# Patient Record
Sex: MALE | Race: WHITE | URBAN - METROPOLITAN AREA
[De-identification: names, ages, dates, MRNs, and addresses within clinical notes are randomized per-mention and may not be internally consistent; named-entity substitution may affect disease eponyms.]

---

## 2018-03-30 ENCOUNTER — HOSPITAL ENCOUNTER (OUTPATIENT)
Age: 63
Setting detail: SPECIMEN
Discharge: HOME | End: 2018-03-30
Payer: COMMERCIAL

## 2018-04-04 LAB — SURGICAL PATHOLOGY REPORT: NORMAL

## 2018-05-11 ENCOUNTER — HOSPITAL ENCOUNTER (OUTPATIENT)
Age: 63
Setting detail: SPECIMEN
Discharge: HOME | End: 2018-05-11
Payer: COMMERCIAL

## 2018-05-14 LAB — SURGICAL PATHOLOGY REPORT: NORMAL

## 2024-10-28 ENCOUNTER — APPOINTMENT (OUTPATIENT)
Dept: CT IMAGING | Age: 69
End: 2024-10-28
Payer: MEDICARE

## 2024-10-28 ENCOUNTER — HOSPITAL ENCOUNTER (INPATIENT)
Age: 69
LOS: 3 days | End: 2024-11-01
Attending: EMERGENCY MEDICINE | Admitting: FAMILY MEDICINE
Payer: MEDICARE

## 2024-10-28 ENCOUNTER — APPOINTMENT (OUTPATIENT)
Dept: GENERAL RADIOLOGY | Age: 69
End: 2024-10-28
Payer: MEDICARE

## 2024-10-28 DIAGNOSIS — J90 PLEURAL EFFUSION: ICD-10-CM

## 2024-10-28 DIAGNOSIS — R09.02 HYPOXEMIA: Primary | ICD-10-CM

## 2024-10-28 DIAGNOSIS — A41.9 SEPTICEMIA (HCC): ICD-10-CM

## 2024-10-28 LAB
ABO + RH BLD: NORMAL
ALLEN TEST: ABNORMAL
AMMONIA PLAS-SCNC: 32 UMOL/L (ref 16–60)
ANION GAP SERPL CALCULATED.3IONS-SCNC: 13 MMOL/L (ref 9–17)
ARM BAND NUMBER: NORMAL
BACTERIA URNS QL MICRO: ABNORMAL
BASOPHILS # BLD: 0 K/UL (ref 0–0.2)
BASOPHILS NFR BLD: 0 %
BILIRUB UR QL STRIP: NEGATIVE
BLOOD BANK SAMPLE EXPIRATION: NORMAL
BLOOD GROUP ANTIBODIES SERPL: NEGATIVE
BNP SERPL-MCNC: 4587 PG/ML
BUN SERPL-MCNC: 65 MG/DL (ref 8–23)
BUN/CREAT SERPL: 38 (ref 9–20)
CALCIUM SERPL-MCNC: 7.8 MG/DL (ref 8.6–10.4)
CASTS #/AREA URNS LPF: ABNORMAL /LPF
CASTS #/AREA URNS LPF: ABNORMAL /LPF
CHLORIDE SERPL-SCNC: 88 MMOL/L (ref 98–107)
CK SERPL-CCNC: 90 U/L (ref 39–308)
CLARITY UR: CLEAR
CO2 SERPL-SCNC: 26 MMOL/L (ref 20–31)
COLOR UR: YELLOW
CREAT SERPL-MCNC: 1.7 MG/DL (ref 0.7–1.2)
EOSINOPHIL # BLD: 0 K/UL (ref 0–0.4)
EOSINOPHILS RELATIVE PERCENT: 0 % (ref 1–4)
EPI CELLS #/AREA URNS HPF: ABNORMAL /HPF (ref 0–5)
ERYTHROCYTE [DISTWIDTH] IN BLOOD BY AUTOMATED COUNT: 17.4 % (ref 11.8–14.4)
FIO2: 70
FIO2: 80
FLUAV RNA RESP QL NAA+PROBE: NOT DETECTED
FLUBV RNA RESP QL NAA+PROBE: NOT DETECTED
GFR, ESTIMATED: 43 ML/MIN/1.73M2
GLUCOSE SERPL-MCNC: 157 MG/DL (ref 70–99)
GLUCOSE UR STRIP-MCNC: NEGATIVE MG/DL
HCO3 VENOUS: 25.6 MMOL/L (ref 22–29)
HCO3 VENOUS: 29.8 MMOL/L (ref 22–29)
HCT VFR BLD AUTO: 35.8 % (ref 40.7–50.3)
HGB BLD-MCNC: 10.1 G/DL (ref 13–17)
HGB UR QL STRIP.AUTO: ABNORMAL
IMM GRANULOCYTES # BLD AUTO: 0 K/UL (ref 0–0.3)
IMM GRANULOCYTES NFR BLD: 0 %
INR PPP: 1.5
KETONES UR STRIP-MCNC: NEGATIVE MG/DL
LACTATE BLDV-SCNC: 4.1 MMOL/L (ref 0.5–1.9)
LACTATE BLDV-SCNC: 4.8 MMOL/L (ref 0.5–1.9)
LEUKOCYTE ESTERASE UR QL STRIP: NEGATIVE
LYMPHOCYTES NFR BLD: 0 K/UL (ref 1–4.8)
LYMPHOCYTES RELATIVE PERCENT: 0 % (ref 24–44)
MAGNESIUM SERPL-MCNC: 2.4 MG/DL (ref 1.6–2.6)
MCH RBC QN AUTO: 21 PG (ref 25.2–33.5)
MCHC RBC AUTO-ENTMCNC: 28.2 G/DL (ref 28.4–34.8)
MCV RBC AUTO: 74.6 FL (ref 82.6–102.9)
MODE: ABNORMAL
MONOCYTES NFR BLD: 1.01 K/UL (ref 0.2–0.8)
MONOCYTES NFR BLD: 4 % (ref 1–7)
MORPHOLOGY: ABNORMAL
MUCOUS THREADS URNS QL MICRO: ABNORMAL
MYOGLOBIN SERPL-MCNC: 227 NG/ML (ref 28–72)
MYOGLOBIN SERPL-MCNC: 285 NG/ML (ref 28–72)
NEGATIVE BASE EXCESS, ART: 4 MMOL/L (ref 0–2)
NEGATIVE BASE EXCESS, VEN: 1.8 MMOL/L (ref 0–2)
NEGATIVE BASE EXCESS, VEN: 4 MMOL/L (ref 0–2)
NEUTROPHILS NFR BLD: 96 % (ref 36–66)
NEUTS SEG NFR BLD: 24.29 K/UL (ref 1.8–7.7)
NITRITE UR QL STRIP: NEGATIVE
NRBC BLD-RTO: 0.3 PER 100 WBC
O2 DELIVERY DEVICE: ABNORMAL
O2 DELIVERY DEVICE: ABNORMAL
O2 SAT, VEN: 66.7 % (ref 60–85)
O2 SAT, VEN: 69.2 % (ref 60–85)
PARTIAL THROMBOPLASTIN TIME: 30.7 SEC (ref 23.9–33.8)
PATIENT TEMP: 34.6
PCO2 VENOUS: 73.1 MM HG (ref 41–51)
PCO2 VENOUS: 91.2 MM HG (ref 41–51)
PH UR STRIP: 5.5 [PH] (ref 5–8)
PH VENOUS: 7.12 (ref 7.32–7.43)
PH VENOUS: 7.15 (ref 7.32–7.43)
PLATELET # BLD AUTO: 285 K/UL (ref 138–453)
PMV BLD AUTO: 9.6 FL (ref 8.1–13.5)
PO2 VENOUS: 47.6 MM HG (ref 30–50)
PO2 VENOUS: 47.8 MM HG (ref 30–50)
POC HCO3: 28.4 MMOL/L (ref 21–28)
POC O2 SATURATION: 98 % (ref 94–98)
POC PCO2 TEMP: 65.8 MM HG
POC PCO2: 100.6 MM HG (ref 35–48)
POC PH TEMP: 7.18
POC PH: 7.06 (ref 7.35–7.45)
POC PO2 TEMP: 40.2 MM HG
POC PO2: 153.6 MM HG (ref 83–108)
POTASSIUM SERPL-SCNC: 5.3 MMOL/L (ref 3.7–5.3)
PROT UR STRIP-MCNC: ABNORMAL MG/DL
PROTHROMBIN TIME: 17.6 SEC (ref 11.5–14.2)
RBC # BLD AUTO: 4.8 M/UL (ref 4.21–5.77)
RBC #/AREA URNS HPF: ABNORMAL /HPF (ref 0–2)
SAMPLE SITE: ABNORMAL
SARS-COV-2 RNA RESP QL NAA+PROBE: NOT DETECTED
SODIUM SERPL-SCNC: 127 MMOL/L (ref 135–144)
SOURCE: NORMAL
SP GR UR STRIP: 1.02 (ref 1–1.03)
SPECIMEN DESCRIPTION: NORMAL
TROPONIN I SERPL HS-MCNC: 32 NG/L (ref 0–22)
TROPONIN I SERPL HS-MCNC: 41 NG/L (ref 0–22)
UROBILINOGEN UR STRIP-ACNC: NORMAL EU/DL (ref 0–1)
WBC #/AREA URNS HPF: ABNORMAL /HPF (ref 0–5)
WBC OTHER # BLD: 25.3 K/UL (ref 3.5–11.3)

## 2024-10-28 PROCEDURE — 86900 BLOOD TYPING SEROLOGIC ABO: CPT

## 2024-10-28 PROCEDURE — 96375 TX/PRO/DX INJ NEW DRUG ADDON: CPT

## 2024-10-28 PROCEDURE — 85610 PROTHROMBIN TIME: CPT

## 2024-10-28 PROCEDURE — 83735 ASSAY OF MAGNESIUM: CPT

## 2024-10-28 PROCEDURE — 83874 ASSAY OF MYOGLOBIN: CPT

## 2024-10-28 PROCEDURE — 6360000002 HC RX W HCPCS: Performed by: PHYSICIAN ASSISTANT

## 2024-10-28 PROCEDURE — 86850 RBC ANTIBODY SCREEN: CPT

## 2024-10-28 PROCEDURE — 82550 ASSAY OF CK (CPK): CPT

## 2024-10-28 PROCEDURE — 6360000004 HC RX CONTRAST MEDICATION: Performed by: PHYSICIAN ASSISTANT

## 2024-10-28 PROCEDURE — 85730 THROMBOPLASTIN TIME PARTIAL: CPT

## 2024-10-28 PROCEDURE — 93005 ELECTROCARDIOGRAM TRACING: CPT | Performed by: PHYSICIAN ASSISTANT

## 2024-10-28 PROCEDURE — 99285 EMERGENCY DEPT VISIT HI MDM: CPT

## 2024-10-28 PROCEDURE — 87086 URINE CULTURE/COLONY COUNT: CPT

## 2024-10-28 PROCEDURE — 73630 X-RAY EXAM OF FOOT: CPT

## 2024-10-28 PROCEDURE — 70450 CT HEAD/BRAIN W/O DYE: CPT

## 2024-10-28 PROCEDURE — 83880 ASSAY OF NATRIURETIC PEPTIDE: CPT

## 2024-10-28 PROCEDURE — 2500000003 HC RX 250 WO HCPCS: Performed by: PHYSICIAN ASSISTANT

## 2024-10-28 PROCEDURE — 2580000003 HC RX 258: Performed by: PHYSICIAN ASSISTANT

## 2024-10-28 PROCEDURE — 87040 BLOOD CULTURE FOR BACTERIA: CPT

## 2024-10-28 PROCEDURE — 84484 ASSAY OF TROPONIN QUANT: CPT

## 2024-10-28 PROCEDURE — 81001 URINALYSIS AUTO W/SCOPE: CPT

## 2024-10-28 PROCEDURE — 82140 ASSAY OF AMMONIA: CPT

## 2024-10-28 PROCEDURE — 85025 COMPLETE CBC W/AUTO DIFF WBC: CPT

## 2024-10-28 PROCEDURE — 96374 THER/PROPH/DIAG INJ IV PUSH: CPT

## 2024-10-28 PROCEDURE — 94761 N-INVAS EAR/PLS OXIMETRY MLT: CPT

## 2024-10-28 PROCEDURE — 87636 SARSCOV2 & INF A&B AMP PRB: CPT

## 2024-10-28 PROCEDURE — 94640 AIRWAY INHALATION TREATMENT: CPT

## 2024-10-28 PROCEDURE — 83605 ASSAY OF LACTIC ACID: CPT

## 2024-10-28 PROCEDURE — 87186 SC STD MICRODIL/AGAR DIL: CPT

## 2024-10-28 PROCEDURE — 82803 BLOOD GASES ANY COMBINATION: CPT

## 2024-10-28 PROCEDURE — 71045 X-RAY EXAM CHEST 1 VIEW: CPT

## 2024-10-28 PROCEDURE — 2700000000 HC OXYGEN THERAPY PER DAY

## 2024-10-28 PROCEDURE — 80048 BASIC METABOLIC PNL TOTAL CA: CPT

## 2024-10-28 PROCEDURE — 0BH17EZ INSERTION OF ENDOTRACHEAL AIRWAY INTO TRACHEA, VIA NATURAL OR ARTIFICIAL OPENING: ICD-10-PCS | Performed by: EMERGENCY MEDICINE

## 2024-10-28 PROCEDURE — 3E033XZ INTRODUCTION OF VASOPRESSOR INTO PERIPHERAL VEIN, PERCUTANEOUS APPROACH: ICD-10-PCS | Performed by: FAMILY MEDICINE

## 2024-10-28 PROCEDURE — 71260 CT THORAX DX C+: CPT

## 2024-10-28 PROCEDURE — 36600 WITHDRAWAL OF ARTERIAL BLOOD: CPT

## 2024-10-28 PROCEDURE — 5A09357 ASSISTANCE WITH RESPIRATORY VENTILATION, LESS THAN 24 CONSECUTIVE HOURS, CONTINUOUS POSITIVE AIRWAY PRESSURE: ICD-10-PCS | Performed by: EMERGENCY MEDICINE

## 2024-10-28 PROCEDURE — 86901 BLOOD TYPING SEROLOGIC RH(D): CPT

## 2024-10-28 PROCEDURE — 87077 CULTURE AEROBIC IDENTIFY: CPT

## 2024-10-28 PROCEDURE — 94660 CPAP INITIATION&MGMT: CPT

## 2024-10-28 PROCEDURE — 96365 THER/PROPH/DIAG IV INF INIT: CPT

## 2024-10-28 PROCEDURE — 74177 CT ABD & PELVIS W/CONTRAST: CPT

## 2024-10-28 RX ORDER — LORAZEPAM 2 MG/ML
0.5 INJECTION INTRAMUSCULAR ONCE
Status: COMPLETED | OUTPATIENT
Start: 2024-10-28 | End: 2024-10-28

## 2024-10-28 RX ORDER — NOREPINEPHRINE BITARTRATE 0.06 MG/ML
1-100 INJECTION, SOLUTION INTRAVENOUS CONTINUOUS
Status: DISCONTINUED | OUTPATIENT
Start: 2024-10-28 | End: 2024-11-01 | Stop reason: HOSPADM

## 2024-10-28 RX ORDER — 0.9 % SODIUM CHLORIDE 0.9 %
80 INTRAVENOUS SOLUTION INTRAVENOUS ONCE
Status: COMPLETED | OUTPATIENT
Start: 2024-10-28 | End: 2024-10-28

## 2024-10-28 RX ORDER — ALBUTEROL SULFATE 0.83 MG/ML
2.5 SOLUTION RESPIRATORY (INHALATION) ONCE
Status: COMPLETED | OUTPATIENT
Start: 2024-10-28 | End: 2024-10-28

## 2024-10-28 RX ORDER — SODIUM CHLORIDE 0.9 % (FLUSH) 0.9 %
10 SYRINGE (ML) INJECTION PRN
Status: DISCONTINUED | OUTPATIENT
Start: 2024-10-28 | End: 2024-11-01 | Stop reason: HOSPADM

## 2024-10-28 RX ORDER — IOPAMIDOL 755 MG/ML
75 INJECTION, SOLUTION INTRAVASCULAR
Status: COMPLETED | OUTPATIENT
Start: 2024-10-28 | End: 2024-10-28

## 2024-10-28 RX ORDER — SODIUM CHLORIDE 0.9 % (FLUSH) 0.9 %
5-40 SYRINGE (ML) INJECTION PRN
Status: DISCONTINUED | OUTPATIENT
Start: 2024-10-28 | End: 2024-11-01 | Stop reason: HOSPADM

## 2024-10-28 RX ORDER — 0.9 % SODIUM CHLORIDE 0.9 %
500 INTRAVENOUS SOLUTION INTRAVENOUS ONCE
Status: COMPLETED | OUTPATIENT
Start: 2024-10-28 | End: 2024-10-28

## 2024-10-28 RX ORDER — LEVOFLOXACIN 5 MG/ML
500 INJECTION, SOLUTION INTRAVENOUS ONCE
Status: COMPLETED | OUTPATIENT
Start: 2024-10-28 | End: 2024-10-28

## 2024-10-28 RX ORDER — SODIUM CHLORIDE 9 MG/ML
INJECTION, SOLUTION INTRAVENOUS PRN
Status: DISCONTINUED | OUTPATIENT
Start: 2024-10-28 | End: 2024-10-29 | Stop reason: SDUPTHER

## 2024-10-28 RX ORDER — SODIUM CHLORIDE 0.9 % (FLUSH) 0.9 %
5-40 SYRINGE (ML) INJECTION EVERY 12 HOURS SCHEDULED
Status: DISCONTINUED | OUTPATIENT
Start: 2024-10-28 | End: 2024-11-01 | Stop reason: HOSPADM

## 2024-10-28 RX ADMIN — WATER 1000 MG: 1 INJECTION INTRAMUSCULAR; INTRAVENOUS; SUBCUTANEOUS at 21:58

## 2024-10-28 RX ADMIN — SODIUM CHLORIDE 80 ML: 0.9 INJECTION, SOLUTION INTRAVENOUS at 21:32

## 2024-10-28 RX ADMIN — SODIUM CHLORIDE, PRESERVATIVE FREE 10 ML: 5 INJECTION INTRAVENOUS at 21:32

## 2024-10-28 RX ADMIN — LEVOFLOXACIN 500 MG: 5 INJECTION, SOLUTION INTRAVENOUS at 21:57

## 2024-10-28 RX ADMIN — IOPAMIDOL 75 ML: 755 INJECTION, SOLUTION INTRAVENOUS at 21:32

## 2024-10-28 RX ADMIN — LORAZEPAM 0.5 MG: 2 INJECTION INTRAMUSCULAR; INTRAVENOUS at 20:14

## 2024-10-28 RX ADMIN — SODIUM CHLORIDE 500 ML: 0.9 INJECTION, SOLUTION INTRAVENOUS at 21:53

## 2024-10-28 RX ADMIN — NOREPINEPHRINE BITARTRATE 5 MCG/MIN: 0.06 INJECTION, SOLUTION INTRAVENOUS at 22:53

## 2024-10-28 RX ADMIN — SODIUM CHLORIDE, PRESERVATIVE FREE 10 ML: 5 INJECTION INTRAVENOUS at 21:58

## 2024-10-28 RX ADMIN — ALBUTEROL SULFATE 2.5 MG: 2.5 SOLUTION RESPIRATORY (INHALATION) at 19:51

## 2024-10-28 NOTE — ED PROVIDER NOTES
CULTURE, BLOOD 2   CBC WITH AUTO DIFFERENTIAL   BASIC METABOLIC PANEL   TROP/MYOGLOBIN   TROP/MYOGLOBIN   TROP/MYOGLOBIN   CK   LACTATE, SEPSIS   LACTATE, SEPSIS   BRAIN NATRIURETIC PEPTIDE   MAGNESIUM   AMMONIA   TYPE AND SCREEN     CONSULTS:  None  FINAL IMPRESSION    No diagnosis found.        PASTMEDICAL HISTORY     Past Medical History:   Diagnosis Date    Hypertension     Rib fractures      SURGICAL HISTORY     No past surgical history on file.  CURRENT MEDICATIONS       Previous Medications    ALPRAZOLAM (XANAX) 1 MG TABLET    Take 1 mg by mouth 3 times daily as needed for Sleep.    DULOXETINE (CYMBALTA) 30 MG CAPSULE    Take 30 mg by mouth 2 times daily.    IBUPROFEN (ADVIL;MOTRIN) 800 MG TABLET    Take 1 tablet by mouth every 8 hours as needed for Pain.    OXYCODONE-ACETAMINOPHEN (PERCOCET)  MG PER TABLET    Take 1 tablet by mouth every 6 hours as needed for Pain.    VALSARTAN-HYDROCHLOROTHIAZIDE (DIOVAN HCT) 160-25 MG PER TABLET    Take 1 tablet by mouth daily.     ALLERGIES     is allergic to erythromycin and tetracyclines & related.  FAMILY HISTORY     has no family status information on file.      SOCIAL HISTORY       Social History     Tobacco Use    Smoking status: Every Day     Current packs/day: 1.00     Average packs/day: 1 pack/day for 20.0 years (20.0 ttl pk-yrs)     Types: Cigarettes   Substance Use Topics    Alcohol use: No    Drug use: No          Vadim Miller MD  Attending Emergency Physician         Vadim Miller MD  10/28/24 2009

## 2024-10-29 ENCOUNTER — APPOINTMENT (OUTPATIENT)
Dept: GENERAL RADIOLOGY | Age: 69
End: 2024-10-29
Payer: MEDICARE

## 2024-10-29 PROBLEM — E87.1 HYPONATREMIA: Status: ACTIVE | Noted: 2024-10-29

## 2024-10-29 LAB
ALBUMIN SERPL-MCNC: 2.3 G/DL (ref 3.5–5.2)
ALLEN TEST: POSITIVE
ALP SERPL-CCNC: 88 U/L (ref 40–129)
ALT SERPL-CCNC: 176 U/L (ref 5–41)
ANION GAP SERPL CALCULATED.3IONS-SCNC: 13 MMOL/L (ref 9–17)
ANION GAP SERPL CALCULATED.3IONS-SCNC: 15 MMOL/L (ref 9–17)
AST SERPL-CCNC: 96 U/L
BILIRUB DIRECT SERPL-MCNC: 0.3 MG/DL
BILIRUB INDIRECT SERPL-MCNC: 0.1 MG/DL (ref 0–1)
BILIRUB SERPL-MCNC: 0.4 MG/DL (ref 0.3–1.2)
BUN SERPL-MCNC: 68 MG/DL (ref 8–23)
BUN SERPL-MCNC: 75 MG/DL (ref 8–23)
BUN/CREAT SERPL: 40 (ref 9–20)
BUN/CREAT SERPL: 44 (ref 9–20)
CALCIUM SERPL-MCNC: 7.5 MG/DL (ref 8.6–10.4)
CALCIUM SERPL-MCNC: 7.6 MG/DL (ref 8.6–10.4)
CHLORIDE SERPL-SCNC: 91 MMOL/L (ref 98–107)
CHLORIDE SERPL-SCNC: 92 MMOL/L (ref 98–107)
CO2 SERPL-SCNC: 24 MMOL/L (ref 20–31)
CO2 SERPL-SCNC: 29 MMOL/L (ref 20–31)
CREAT SERPL-MCNC: 1.7 MG/DL (ref 0.7–1.2)
CREAT SERPL-MCNC: 1.7 MG/DL (ref 0.7–1.2)
EKG ATRIAL RATE: 90 BPM
EKG P AXIS: 58 DEGREES
EKG P-R INTERVAL: 140 MS
EKG Q-T INTERVAL: 390 MS
EKG QRS DURATION: 100 MS
EKG QTC CALCULATION (BAZETT): 477 MS
EKG R AXIS: 50 DEGREES
EKG T AXIS: 112 DEGREES
EKG VENTRICULAR RATE: 90 BPM
FIO2: 100
FIO2: 60
GFR, ESTIMATED: 43 ML/MIN/1.73M2
GFR, ESTIMATED: 43 ML/MIN/1.73M2
GLUCOSE BLD-MCNC: 153 MG/DL (ref 75–110)
GLUCOSE BLD-MCNC: 158 MG/DL (ref 75–110)
GLUCOSE BLD-MCNC: 161 MG/DL (ref 75–110)
GLUCOSE BLD-MCNC: 172 MG/DL (ref 75–110)
GLUCOSE SERPL-MCNC: 138 MG/DL (ref 70–99)
GLUCOSE SERPL-MCNC: 164 MG/DL (ref 70–99)
HCO3 VENOUS: 32.3 MMOL/L (ref 22–29)
HCT VFR BLD AUTO: 33.4 % (ref 40.7–50.3)
HGB BLD-MCNC: 10.5 G/DL (ref 13–17)
LACTATE BLDV-SCNC: 2.7 MMOL/L (ref 0.5–1.9)
LACTATE BLDV-SCNC: 2.8 MMOL/L (ref 0.5–1.9)
LIPASE SERPL-CCNC: 10 U/L (ref 13–60)
MODE: ABNORMAL
MODE: ABNORMAL
MYOGLOBIN SERPL-MCNC: 230 NG/ML (ref 28–72)
O2 DELIVERY DEVICE: ABNORMAL
O2 DELIVERY DEVICE: ABNORMAL
O2 SAT, VEN: 90.1 % (ref 60–85)
PATIENT TEMP: 37
PCO2 VENOUS: 66.3 MM HG (ref 41–51)
PH VENOUS: 7.3 (ref 7.32–7.43)
PO2 VENOUS: 67.6 MM HG (ref 30–50)
POC HCO3: 29.4 MMOL/L (ref 21–28)
POC HCO3: 31.5 MMOL/L (ref 21–28)
POC O2 SATURATION: 92.3 % (ref 94–98)
POC O2 SATURATION: 99.5 % (ref 94–98)
POC PCO2: 40.7 MM HG (ref 35–48)
POC PCO2: 47.3 MM HG (ref 35–48)
POC PH: 7.43 (ref 7.35–7.45)
POC PH: 7.47 (ref 7.35–7.45)
POC PO2: 156.5 MM HG (ref 83–108)
POC PO2: 63.7 MM HG (ref 83–108)
POSITIVE BASE EXCESS, ART: 5.2 MMOL/L (ref 0–3)
POSITIVE BASE EXCESS, ART: 6.2 MMOL/L (ref 0–3)
POSITIVE BASE EXCESS, VEN: 3.9 MMOL/L (ref 0–3)
POTASSIUM SERPL-SCNC: 4.7 MMOL/L (ref 3.7–5.3)
POTASSIUM SERPL-SCNC: 5.3 MMOL/L (ref 3.7–5.3)
POTASSIUM SERPL-SCNC: 5.8 MMOL/L (ref 3.7–5.3)
PROT SERPL-MCNC: 5.6 G/DL (ref 6.4–8.3)
SAMPLE SITE: ABNORMAL
SAMPLE SITE: ABNORMAL
SODIUM SERPL-SCNC: 130 MMOL/L (ref 135–144)
SODIUM SERPL-SCNC: 131 MMOL/L (ref 135–144)
SODIUM SERPL-SCNC: 132 MMOL/L (ref 135–144)
SODIUM SERPL-SCNC: 134 MMOL/L (ref 135–144)
SODIUM SERPL-SCNC: 134 MMOL/L (ref 135–144)
TROPONIN I SERPL HS-MCNC: 33 NG/L (ref 0–22)

## 2024-10-29 PROCEDURE — 94660 CPAP INITIATION&MGMT: CPT

## 2024-10-29 PROCEDURE — 2500000003 HC RX 250 WO HCPCS

## 2024-10-29 PROCEDURE — 82803 BLOOD GASES ANY COMBINATION: CPT

## 2024-10-29 PROCEDURE — 71045 X-RAY EXAM CHEST 1 VIEW: CPT

## 2024-10-29 PROCEDURE — 2580000003 HC RX 258: Performed by: PHYSICIAN ASSISTANT

## 2024-10-29 PROCEDURE — 85014 HEMATOCRIT: CPT

## 2024-10-29 PROCEDURE — 84295 ASSAY OF SERUM SODIUM: CPT

## 2024-10-29 PROCEDURE — 87070 CULTURE OTHR SPECIMN AEROBIC: CPT

## 2024-10-29 PROCEDURE — 6370000000 HC RX 637 (ALT 250 FOR IP): Performed by: NURSE PRACTITIONER

## 2024-10-29 PROCEDURE — 6370000000 HC RX 637 (ALT 250 FOR IP): Performed by: INTERNAL MEDICINE

## 2024-10-29 PROCEDURE — 6360000002 HC RX W HCPCS: Performed by: FAMILY MEDICINE

## 2024-10-29 PROCEDURE — 2580000003 HC RX 258: Performed by: FAMILY MEDICINE

## 2024-10-29 PROCEDURE — 5A1945Z RESPIRATORY VENTILATION, 24-96 CONSECUTIVE HOURS: ICD-10-PCS | Performed by: INTERNAL MEDICINE

## 2024-10-29 PROCEDURE — 31500 INSERT EMERGENCY AIRWAY: CPT

## 2024-10-29 PROCEDURE — 6360000002 HC RX W HCPCS: Performed by: INTERNAL MEDICINE

## 2024-10-29 PROCEDURE — 6360000002 HC RX W HCPCS: Performed by: EMERGENCY MEDICINE

## 2024-10-29 PROCEDURE — 87040 BLOOD CULTURE FOR BACTERIA: CPT

## 2024-10-29 PROCEDURE — 36620 INSERTION CATHETER ARTERY: CPT

## 2024-10-29 PROCEDURE — 83605 ASSAY OF LACTIC ACID: CPT

## 2024-10-29 PROCEDURE — 6360000002 HC RX W HCPCS: Performed by: PHYSICIAN ASSISTANT

## 2024-10-29 PROCEDURE — 93010 ELECTROCARDIOGRAM REPORT: CPT | Performed by: INTERNAL MEDICINE

## 2024-10-29 PROCEDURE — 84132 ASSAY OF SERUM POTASSIUM: CPT

## 2024-10-29 PROCEDURE — 85018 HEMOGLOBIN: CPT

## 2024-10-29 PROCEDURE — 94761 N-INVAS EAR/PLS OXIMETRY MLT: CPT

## 2024-10-29 PROCEDURE — 2500000003 HC RX 250 WO HCPCS: Performed by: INTERNAL MEDICINE

## 2024-10-29 PROCEDURE — 96375 TX/PRO/DX INJ NEW DRUG ADDON: CPT

## 2024-10-29 PROCEDURE — 2500000003 HC RX 250 WO HCPCS: Performed by: NURSE PRACTITIONER

## 2024-10-29 PROCEDURE — 2580000003 HC RX 258: Performed by: INTERNAL MEDICINE

## 2024-10-29 PROCEDURE — 6360000002 HC RX W HCPCS

## 2024-10-29 PROCEDURE — 2000000000 HC ICU R&B

## 2024-10-29 PROCEDURE — 89220 SPUTUM SPECIMEN COLLECTION: CPT

## 2024-10-29 PROCEDURE — 80076 HEPATIC FUNCTION PANEL: CPT

## 2024-10-29 PROCEDURE — 2580000003 HC RX 258: Performed by: NURSE PRACTITIONER

## 2024-10-29 PROCEDURE — 36415 COLL VENOUS BLD VENIPUNCTURE: CPT

## 2024-10-29 PROCEDURE — 94002 VENT MGMT INPAT INIT DAY: CPT

## 2024-10-29 PROCEDURE — 36600 WITHDRAWAL OF ARTERIAL BLOOD: CPT

## 2024-10-29 PROCEDURE — 87205 SMEAR GRAM STAIN: CPT

## 2024-10-29 PROCEDURE — 74018 RADEX ABDOMEN 1 VIEW: CPT

## 2024-10-29 PROCEDURE — 82947 ASSAY GLUCOSE BLOOD QUANT: CPT

## 2024-10-29 PROCEDURE — 2700000000 HC OXYGEN THERAPY PER DAY

## 2024-10-29 PROCEDURE — 80048 BASIC METABOLIC PNL TOTAL CA: CPT

## 2024-10-29 PROCEDURE — 6360000002 HC RX W HCPCS: Performed by: NURSE PRACTITIONER

## 2024-10-29 PROCEDURE — 83690 ASSAY OF LIPASE: CPT

## 2024-10-29 PROCEDURE — 94640 AIRWAY INHALATION TREATMENT: CPT

## 2024-10-29 PROCEDURE — 02HV33Z INSERTION OF INFUSION DEVICE INTO SUPERIOR VENA CAVA, PERCUTANEOUS APPROACH: ICD-10-PCS | Performed by: FAMILY MEDICINE

## 2024-10-29 RX ORDER — POTASSIUM CHLORIDE 29.8 MG/ML
20 INJECTION INTRAVENOUS PRN
Status: DISCONTINUED | OUTPATIENT
Start: 2024-10-29 | End: 2024-11-01 | Stop reason: HOSPADM

## 2024-10-29 RX ORDER — LORAZEPAM 2 MG/ML
0.5 INJECTION INTRAMUSCULAR ONCE
Status: COMPLETED | OUTPATIENT
Start: 2024-10-29 | End: 2024-10-29

## 2024-10-29 RX ORDER — DAPAGLIFLOZIN AND METFORMIN HYDROCHLORIDE 10; 1000 MG/1; MG/1
1 TABLET, FILM COATED, EXTENDED RELEASE ORAL DAILY
COMMUNITY

## 2024-10-29 RX ORDER — IPRATROPIUM BROMIDE AND ALBUTEROL SULFATE 2.5; .5 MG/3ML; MG/3ML
1 SOLUTION RESPIRATORY (INHALATION)
Status: DISCONTINUED | OUTPATIENT
Start: 2024-10-29 | End: 2024-10-29

## 2024-10-29 RX ORDER — PROPOFOL 10 MG/ML
5-50 INJECTION, EMULSION INTRAVENOUS CONTINUOUS
Status: DISCONTINUED | OUTPATIENT
Start: 2024-10-29 | End: 2024-10-29

## 2024-10-29 RX ORDER — LEVALBUTEROL 1.25 MG/.5ML
1.25 SOLUTION, CONCENTRATE RESPIRATORY (INHALATION)
Status: DISCONTINUED | OUTPATIENT
Start: 2024-10-29 | End: 2024-10-31

## 2024-10-29 RX ORDER — FUROSEMIDE 10 MG/ML
20 INJECTION INTRAMUSCULAR; INTRAVENOUS 2 TIMES DAILY
Status: DISCONTINUED | OUTPATIENT
Start: 2024-10-29 | End: 2024-10-30

## 2024-10-29 RX ORDER — BUDESONIDE 0.5 MG/2ML
0.5 INHALANT ORAL
Status: DISCONTINUED | OUTPATIENT
Start: 2024-10-29 | End: 2024-10-31

## 2024-10-29 RX ORDER — SODIUM CHLORIDE 9 MG/ML
INJECTION, SOLUTION INTRAVENOUS PRN
Status: DISCONTINUED | OUTPATIENT
Start: 2024-10-29 | End: 2024-11-01 | Stop reason: HOSPADM

## 2024-10-29 RX ORDER — SODIUM CHLORIDE 0.9 % (FLUSH) 0.9 %
5-40 SYRINGE (ML) INJECTION PRN
Status: DISCONTINUED | OUTPATIENT
Start: 2024-10-29 | End: 2024-11-01 | Stop reason: HOSPADM

## 2024-10-29 RX ORDER — SODIUM CHLORIDE FOR INHALATION 0.9 %
3 VIAL, NEBULIZER (ML) INHALATION EVERY 8 HOURS PRN
Status: DISCONTINUED | OUTPATIENT
Start: 2024-10-29 | End: 2024-11-01

## 2024-10-29 RX ORDER — PROPOFOL 10 MG/ML
5-50 INJECTION, EMULSION INTRAVENOUS CONTINUOUS
Status: DISCONTINUED | OUTPATIENT
Start: 2024-10-29 | End: 2024-11-01 | Stop reason: HOSPADM

## 2024-10-29 RX ORDER — ENOXAPARIN SODIUM 100 MG/ML
40 INJECTION SUBCUTANEOUS DAILY
Status: DISCONTINUED | OUTPATIENT
Start: 2024-10-29 | End: 2024-11-01 | Stop reason: HOSPADM

## 2024-10-29 RX ORDER — SODIUM CHLORIDE 0.9 % (FLUSH) 0.9 %
5-40 SYRINGE (ML) INJECTION EVERY 12 HOURS SCHEDULED
Status: DISCONTINUED | OUTPATIENT
Start: 2024-10-29 | End: 2024-11-01 | Stop reason: HOSPADM

## 2024-10-29 RX ORDER — 0.9 % SODIUM CHLORIDE 0.9 %
500 INTRAVENOUS SOLUTION INTRAVENOUS ONCE
Status: COMPLETED | OUTPATIENT
Start: 2024-10-29 | End: 2024-10-29

## 2024-10-29 RX ORDER — 0.9 % SODIUM CHLORIDE 0.9 %
1000 INTRAVENOUS SOLUTION INTRAVENOUS ONCE
Status: COMPLETED | OUTPATIENT
Start: 2024-10-29 | End: 2024-10-29

## 2024-10-29 RX ORDER — TAMSULOSIN HYDROCHLORIDE 0.4 MG/1
0.8 CAPSULE ORAL DAILY
COMMUNITY

## 2024-10-29 RX ORDER — FENTANYL CITRATE 0.05 MG/ML
25 INJECTION, SOLUTION INTRAMUSCULAR; INTRAVENOUS
Status: DISCONTINUED | OUTPATIENT
Start: 2024-10-29 | End: 2024-10-30

## 2024-10-29 RX ORDER — LORAZEPAM 2 MG/ML
1 INJECTION INTRAMUSCULAR
Status: DISCONTINUED | OUTPATIENT
Start: 2024-10-29 | End: 2024-10-31

## 2024-10-29 RX ORDER — CYCLOBENZAPRINE HCL 10 MG
10 TABLET ORAL 3 TIMES DAILY PRN
COMMUNITY

## 2024-10-29 RX ORDER — LORAZEPAM 2 MG/ML
0.25 INJECTION INTRAMUSCULAR ONCE
Status: COMPLETED | OUTPATIENT
Start: 2024-10-29 | End: 2024-10-29

## 2024-10-29 RX ORDER — FOLIC ACID 1 MG/1
1 TABLET ORAL DAILY
Status: DISCONTINUED | OUTPATIENT
Start: 2024-10-29 | End: 2024-10-30

## 2024-10-29 RX ORDER — ONDANSETRON 4 MG/1
4 TABLET, ORALLY DISINTEGRATING ORAL EVERY 8 HOURS PRN
Status: DISCONTINUED | OUTPATIENT
Start: 2024-10-29 | End: 2024-11-01 | Stop reason: HOSPADM

## 2024-10-29 RX ORDER — ACETAMINOPHEN 325 MG/1
650 TABLET ORAL EVERY 6 HOURS PRN
Status: DISCONTINUED | OUTPATIENT
Start: 2024-10-29 | End: 2024-11-01 | Stop reason: HOSPADM

## 2024-10-29 RX ORDER — ONDANSETRON 2 MG/ML
4 INJECTION INTRAMUSCULAR; INTRAVENOUS EVERY 6 HOURS PRN
Status: DISCONTINUED | OUTPATIENT
Start: 2024-10-29 | End: 2024-11-01 | Stop reason: HOSPADM

## 2024-10-29 RX ORDER — ACETAMINOPHEN 650 MG/1
650 SUPPOSITORY RECTAL EVERY 6 HOURS PRN
Status: DISCONTINUED | OUTPATIENT
Start: 2024-10-29 | End: 2024-11-01 | Stop reason: HOSPADM

## 2024-10-29 RX ORDER — MAGNESIUM SULFATE IN WATER 40 MG/ML
2000 INJECTION, SOLUTION INTRAVENOUS PRN
Status: DISCONTINUED | OUTPATIENT
Start: 2024-10-29 | End: 2024-11-01 | Stop reason: HOSPADM

## 2024-10-29 RX ORDER — POLYETHYLENE GLYCOL 3350 17 G/17G
17 POWDER, FOR SOLUTION ORAL DAILY PRN
Status: DISCONTINUED | OUTPATIENT
Start: 2024-10-29 | End: 2024-11-01 | Stop reason: HOSPADM

## 2024-10-29 RX ORDER — CLOPIDOGREL BISULFATE 75 MG/1
75 TABLET ORAL DAILY
COMMUNITY

## 2024-10-29 RX ORDER — POTASSIUM CHLORIDE 7.45 MG/ML
10 INJECTION INTRAVENOUS PRN
Status: DISCONTINUED | OUTPATIENT
Start: 2024-10-29 | End: 2024-11-01 | Stop reason: HOSPADM

## 2024-10-29 RX ADMIN — DEXMEDETOMIDINE 1.4 MCG/KG/HR: 100 INJECTION, SOLUTION INTRAVENOUS at 09:30

## 2024-10-29 RX ADMIN — FUROSEMIDE 20 MG: 10 INJECTION, SOLUTION INTRAMUSCULAR; INTRAVENOUS at 17:25

## 2024-10-29 RX ADMIN — DEXMEDETOMIDINE 1.5 MCG/KG/HR: 100 INJECTION, SOLUTION INTRAVENOUS at 13:58

## 2024-10-29 RX ADMIN — FAMOTIDINE 20 MG: 10 INJECTION, SOLUTION INTRAVENOUS at 09:12

## 2024-10-29 RX ADMIN — IPRATROPIUM BROMIDE 0.5 MG: 0.5 SOLUTION RESPIRATORY (INHALATION) at 11:00

## 2024-10-29 RX ADMIN — FOLIC ACID 1 MG: 1 TABLET ORAL at 21:00

## 2024-10-29 RX ADMIN — SODIUM CHLORIDE, PRESERVATIVE FREE 40 MG: 5 INJECTION INTRAVENOUS at 17:25

## 2024-10-29 RX ADMIN — WATER 60 MG: 1 INJECTION INTRAMUSCULAR; INTRAVENOUS; SUBCUTANEOUS at 13:08

## 2024-10-29 RX ADMIN — LEVALBUTEROL 1.25 MG: 1.25 SOLUTION, CONCENTRATE RESPIRATORY (INHALATION) at 11:00

## 2024-10-29 RX ADMIN — PROPOFOL 20 MCG/KG/MIN: 10 INJECTION, EMULSION INTRAVENOUS at 15:29

## 2024-10-29 RX ADMIN — LORAZEPAM 0.5 MG: 2 INJECTION INTRAMUSCULAR; INTRAVENOUS at 01:49

## 2024-10-29 RX ADMIN — SODIUM CHLORIDE, PRESERVATIVE FREE 10 ML: 5 INJECTION INTRAVENOUS at 09:13

## 2024-10-29 RX ADMIN — THIAMINE HYDROCHLORIDE 100 MG: 100 INJECTION, SOLUTION INTRAMUSCULAR; INTRAVENOUS at 20:46

## 2024-10-29 RX ADMIN — DOXYCYCLINE 100 MG: 100 INJECTION, POWDER, LYOPHILIZED, FOR SOLUTION INTRAVENOUS at 09:29

## 2024-10-29 RX ADMIN — DOXYCYCLINE 100 MG: 100 INJECTION, POWDER, LYOPHILIZED, FOR SOLUTION INTRAVENOUS at 20:47

## 2024-10-29 RX ADMIN — ISODIUM CHLORIDE 3 ML: 0.03 SOLUTION RESPIRATORY (INHALATION) at 15:20

## 2024-10-29 RX ADMIN — DEXMEDETOMIDINE 0.2 MCG/KG/HR: 100 INJECTION, SOLUTION INTRAVENOUS at 02:14

## 2024-10-29 RX ADMIN — LEVALBUTEROL 1.25 MG: 1.25 SOLUTION, CONCENTRATE RESPIRATORY (INHALATION) at 19:41

## 2024-10-29 RX ADMIN — ACETAMINOPHEN 650 MG: 650 SUPPOSITORY RECTAL at 18:50

## 2024-10-29 RX ADMIN — SODIUM ZIRCONIUM CYCLOSILICATE 10 G: 10 POWDER, FOR SUSPENSION ORAL at 13:08

## 2024-10-29 RX ADMIN — DEXMEDETOMIDINE 1.3 MCG/KG/HR: 100 INJECTION, SOLUTION INTRAVENOUS at 18:48

## 2024-10-29 RX ADMIN — SODIUM CHLORIDE, PRESERVATIVE FREE 10 ML: 5 INJECTION INTRAVENOUS at 20:48

## 2024-10-29 RX ADMIN — SODIUM CHLORIDE 1000 ML: 9 INJECTION, SOLUTION INTRAVENOUS at 19:19

## 2024-10-29 RX ADMIN — SODIUM CHLORIDE 500 ML: 9 INJECTION, SOLUTION INTRAVENOUS at 17:19

## 2024-10-29 RX ADMIN — LEVALBUTEROL 1.25 MG: 1.25 SOLUTION, CONCENTRATE RESPIRATORY (INHALATION) at 15:15

## 2024-10-29 RX ADMIN — LORAZEPAM 1 MG: 2 INJECTION INTRAMUSCULAR; INTRAVENOUS at 13:57

## 2024-10-29 RX ADMIN — BUDESONIDE 500 MCG: 0.5 SUSPENSION RESPIRATORY (INHALATION) at 11:00

## 2024-10-29 RX ADMIN — METHYLPREDNISOLONE SODIUM SUCCINATE 125 MG: 125 INJECTION INTRAMUSCULAR; INTRAVENOUS at 00:07

## 2024-10-29 RX ADMIN — BUDESONIDE 500 MCG: 0.5 SUSPENSION RESPIRATORY (INHALATION) at 19:41

## 2024-10-29 RX ADMIN — LORAZEPAM 1 MG: 2 INJECTION INTRAMUSCULAR; INTRAVENOUS at 05:37

## 2024-10-29 RX ADMIN — PROPOFOL 35 MCG/KG/MIN: 10 INJECTION, EMULSION INTRAVENOUS at 22:12

## 2024-10-29 RX ADMIN — WATER 60 MG: 1 INJECTION INTRAMUSCULAR; INTRAVENOUS; SUBCUTANEOUS at 18:47

## 2024-10-29 RX ADMIN — SODIUM CHLORIDE, PRESERVATIVE FREE 10 ML: 5 INJECTION INTRAVENOUS at 20:49

## 2024-10-29 RX ADMIN — LORAZEPAM 0.26 MG: 2 INJECTION INTRAMUSCULAR; INTRAVENOUS at 00:39

## 2024-10-29 RX ADMIN — CEFTRIAXONE SODIUM 1000 MG: 1 INJECTION, POWDER, FOR SOLUTION INTRAMUSCULAR; INTRAVENOUS at 21:00

## 2024-10-29 RX ADMIN — ENOXAPARIN SODIUM 40 MG: 100 INJECTION SUBCUTANEOUS at 09:12

## 2024-10-29 ASSESSMENT — PAIN SCALES - GENERAL
PAINLEVEL_OUTOF10: 3
PAINLEVEL_OUTOF10: 6
PAINLEVEL_OUTOF10: 2
PAINLEVEL_OUTOF10: 1
PAINLEVEL_OUTOF10: 1
PAINLEVEL_OUTOF10: 0

## 2024-10-29 ASSESSMENT — PULMONARY FUNCTION TESTS
PIF_VALUE: 31
PIF_VALUE: 27
PIF_VALUE: 23

## 2024-10-29 NOTE — CARE COORDINATION
Case Management Assessment  Initial Evaluation    Date/Time of Evaluation: 10/29/2024 6:02 PM  Assessment Completed by: JESSICA Chen, LSW    If patient is discharged prior to next notation, then this note serves as note for discharge by case management.    Patient Name: Rodríguez Krueger                   YOB: 1955  Diagnosis: Hypoxemia [R09.02]  Hyponatremia [E87.1]  Pleural effusion [J90]  Septicemia (HCC) [A41.9]                   Date / Time: 10/28/2024  7:32 PM    Patient Admission Status: Inpatient   Readmission Risk (Low < 19, Mod (19-27), High > 27): Readmission Risk Score: 15.5    Current PCP: Rupesh Thompson MD  PCP verified by CM? Yes    Chart Reviewed: Yes      History Provided by: Child/Family  Patient Orientation: Unresponsive    Patient Cognition: Severely Impaired    Hospitalization in the last 30 days (Readmission):  No    If yes, Readmission Assessment in  Navigator will be completed.    Advance Directives:      Code Status: Full Code   Patient's Primary Decision Maker is: Legal Next of Kin      Discharge Planning:    Patient lives with: Alone Type of Home: Apartment  Primary Care Giver: Self  Patient Support Systems include: Spouse/Significant Other, Children   Current Financial resources: Medicare  Current community resources: None  Current services prior to admission: None            Current DME:              Type of Home Care services:  None    ADLS  Prior functional level: Assistance with the following:, Bathing, Dressing, Cooking, Housework, Shopping, Mobility  Current functional level: Assistance with the following:, Bathing, Dressing, Toileting, Feeding, Cooking, Housework, Shopping, Mobility    PT AM-PAC:   /24  OT AM-PAC:   /24    Family can provide assistance at DC:  (unknown at this time how much help will be needed.)  Would you like Case Management to discuss the discharge plan with any other family members/significant others, and if so, who? Yes  Plans to

## 2024-10-29 NOTE — PLAN OF CARE
Problem: Respiratory - Adult  Goal: Achieves optimal ventilation and oxygenation  10/29/2024 1750 by Rosario Lopez RN  Outcome: Progressing  Flowsheets (Taken 10/29/2024 1600)  Achieves optimal ventilation and oxygenation:   Assess for changes in respiratory status   Assess for changes in mentation and behavior   Position to facilitate oxygenation and minimize respiratory effort   Oxygen supplementation based on oxygen saturation or arterial blood gases  10/29/2024 0742 by Celestina Brennan RN  Outcome: Progressing  10/29/2024 0721 by Melly Randall RCP  Outcome: Progressing  Flowsheets (Taken 10/29/2024 0233 by Celestina Brennan RN)  Achieves optimal ventilation and oxygenation:   Assess for changes in respiratory status   Assess for changes in mentation and behavior   Position to facilitate oxygenation and minimize respiratory effort   Oxygen supplementation based on oxygen saturation or arterial blood gases   Assess the need for suctioning and aspirate as needed   Assess and instruct to report shortness of breath or any respiratory difficulty   Respiratory therapy support as indicated  Goal: Adequate oxygenation  Description: Adequate oxygenation  10/29/2024 0721 by Melly Randall RCP  Outcome: Progressing  Goal: Able to breathe comfortably  10/29/2024 0721 by Melly Randall RCP  Outcome: Progressing  Goal: Patient's breath sounds will be clear and equal  10/29/2024 0721 by Melly Randall RCP  Outcome: Progressing     Problem: Chronic Conditions and Co-morbidities  Goal: Patient's chronic conditions and co-morbidity symptoms are monitored and maintained or improved  10/29/2024 1750 by Rosario Lopez RN  Outcome: Progressing  10/29/2024 0742 by Celestina Brennan RN  Outcome: Progressing  Flowsheets (Taken 10/29/2024 0233)  Care Plan - Patient's Chronic Conditions and Co-Morbidity Symptoms are Monitored and Maintained or Improved:   Monitor and assess patient's chronic conditions and comorbid symptoms  for stability, deterioration, or improvement   Collaborate with multidisciplinary team to address chronic and comorbid conditions and prevent exacerbation or deterioration   Update acute care plan with appropriate goals if chronic or comorbid symptoms are exacerbated and prevent overall improvement and discharge     Problem: Discharge Planning  Goal: Discharge to home or other facility with appropriate resources  10/29/2024 1750 by Rosario Lopez RN  Outcome: Progressing  10/29/2024 0742 by Celestina Brennan RN  Outcome: Progressing  Flowsheets (Taken 10/29/2024 0233)  Discharge to home or other facility with appropriate resources:   Identify barriers to discharge with patient and caregiver   Arrange for needed discharge resources and transportation as appropriate   Identify discharge learning needs (meds, wound care, etc)     Problem: Skin/Tissue Integrity  Goal: Absence of new skin breakdown  Description: 1.  Monitor for areas of redness and/or skin breakdown  2.  Assess vascular access sites hourly  3.  Every 4-6 hours minimum:  Change oxygen saturation probe site  4.  Every 4-6 hours:  If on nasal continuous positive airway pressure, respiratory therapy assess nares and determine need for appliance change or resting period.  10/29/2024 1750 by Rosario Lopez RN  Outcome: Progressing  10/29/2024 0742 by Celestina Brennan RN  Outcome: Progressing     Problem: Safety - Adult  Goal: Free from fall injury  10/29/2024 1750 by Rosario Lopez RN  Outcome: Progressing  10/29/2024 0742 by Celestina Brennan RN  Outcome: Progressing     Problem: Pain  Goal: Verbalizes/displays adequate comfort level or baseline comfort level  10/29/2024 1750 by Rosario Lopez RN  Outcome: Progressing  10/29/2024 0742 by Celestina Brennan RN  Outcome: Progressing     Problem: Safety - Medical Restraint  Goal: Remains free of injury from restraints (Restraint for Interference with Medical Device)  Description: INTERVENTIONS:  1. Determine

## 2024-10-29 NOTE — PROGRESS NOTES
Patient began to desat @ 1350 to mid 60's. Provider notified and decided to emergently intubate. Patient was given 30 of etomidate and 100 of succs. Intubation was successful - ET tube is 26 at the teeth. Confirmed with x-ray. Patient started on propofol for sedation

## 2024-10-29 NOTE — PROGRESS NOTES
Upon initiation of NIV patient is educated on the need to be NPO, to not interrupt NIV except for routine oral care, and the need for increased monitoring requirements.  Purpose and advantages of NIV discussed.  Patient instructed to immediately report nausea, chest discomfort, sudden increase in shortness of breath or severe headache.  BP (!) 96/54   Pulse 86   Resp 19   Wt 63.5 kg (140 lb)   SpO2 98%   BMI 18.99 kg/m²     Alert, Oriented, and Cooperative = 0    Diminshed bilaterally and/or crackles = 2    Continuous Telemetry monitoring Yes  Accessory muscle use No  Signs of discomfort No  Compliant with mask Yes  Skin integrity compromised No  ABG/VBG Yes      VBG drawn pre Bipap (20:06)    pH:      7.12  CO2:    91  O2:      48  HCO3: 29.8  SvO2:  67%

## 2024-10-29 NOTE — PROGRESS NOTES
End Of Shift Note  [facility]  Summary of shift: The patient was intubated after an episode which the patient's O2 was in the 60's down to 45. The patient had an ART line placed in the write wrist, a triple lumen PICC in the right basilic, and has Precedex, Propofol, and Levo currently running. The patient is receiving 1.5L of NS for suspected sepsis after the patient began running a temp of 100.4. Blood cultures were sent x2, along with a lactic, LFT's, Lipase, K, and H/H. The patient had 1050 urine output this shift.     Vitals:    Vitals:    10/29/24 1830 10/29/24 1845 10/29/24 1848 10/29/24 1900   BP:       Pulse: (!) 113 (!) 114 (!) 119 (!) 110   Resp: 22 22 22 22   Temp: 100.4 °F (38 °C) 100.4 °F (38 °C) 100.4 °F (38 °C) 98.1 °F (36.7 °C)   TempSrc:       SpO2:  99% 100%    Weight:       Height:            I&O:   Intake/Output Summary (Last 24 hours) at 10/29/2024 1923  Last data filed at 10/29/2024 1922  Gross per 24 hour   Intake 1367.55 ml   Output 2650 ml   Net -1282.45 ml       Resp Status: Ventilator    Ventilator Settings:  Vent Mode: AC/PRVC Resp Rate (Set): 22 bpm/Vt (Set, mL): 500 mL/ /FiO2 : 70 %    Critical Care IV infusions:   sodium chloride      dexmedeTOMIDine (PRECEDEX) 400 mcg in sodium chloride 0.9 % 100 mL infusion 1.3 mcg/kg/hr (10/29/24 1922)    propofol 30 mcg/kg/min (10/29/24 1922)    norepinephrine 12 mcg/min (10/29/24 1922)        LDA:   PICC 10/29/24 Left Brachial (Active)   Number of days: 0       Peripheral IV 10/28/24 Left Forearm (Active)   Number of days: 0       Peripheral IV 10/29/24 Right Antecubital (Active)   Number of days: 0       NG/OG/NJ/NE Tube Nasogastric 16 fr Right nostril (Active)   Number of days: 0       Urinary Catheter 10/28/24 Baxter (Active)   Number of days: 0       ETT  (Active)   Number of days: 0       Arterial Line 10/29/24 Right Radial (Active)   Number of days: 0

## 2024-10-29 NOTE — H&P
History & Physical  [facility].,    Adult Hospitalist      Name: Rodríguez Krueger  MRN: [id number]     Acct: [id number]  Room: [address] [place] [address]    Admit Date: 10/28/2024  7:32 PM  PCP: Rupesh Thompson MD    Primary Problem  Principal Problem:    Hyponatremia  Resolved Problems:    * No resolved hospital problems. *        Assesment/ plan:     Acute hypoxemic respiratory failure  Patient was initially put on BiPAP/high flow nasal cannula oxygen, further patient did require Precedex for agitation eventually was intubated on 10/29/2024  Currently Requiring full ventilatory support  Critical care consult    Bilateral pleural effusion/pulmonary edema:  IR is consulted for right-sided thoracocentesis  IV Lasix  Echocardiogram    Acute COPD exacerbation:  IV Solu-Medrol  Xopenex/Atrovent breathing treatment    Acute bacterial pneumonia/septicemia/bilateral pyelonephritis:  Urine culture  Blood culture  IV Rocephin  IV Vibramycin    Lung cancer/lung mass:  Patient to follow-up with oncology as an outpatient    Coffee-ground NG output:  IV Protonix  Consult gastroenterology  Continue Lovenox for lactulose if drop in hemoglobin can be held or discontinued    Acute kidney injury/hyponatremia:  Nephrology following      Continue to monitor/telemetry/CBC with differential daily/BMP daily  DVT and GI prophylaxis.  Continue medications as below      Scheduled Meds:   sodium chloride flush  5-40 mL IntraVENous 2 times per day    enoxaparin  40 mg SubCUTAneous Daily    famotidine (PEPCID) injection  20 mg IntraVENous Daily    cefTRIAXone (ROCEPHIN) IV  1,000 mg IntraVENous Q24H    doxycycline (VIBRAMYCIN) IV  100 mg IntraVENous Q12H    levalbuterol  1.25 mg Nebulization 4x Daily RT    budesonide  0.5 mg Nebulization BID RT    methylPREDNISolone  60 mg IntraVENous Q8H    sodium chloride  500 mL IntraVENous Once    furosemide  20 mg IntraVENous BID    sodium chloride flush  5-40 mL IntraVENous 2 times per day     Continuous

## 2024-10-29 NOTE — PROGRESS NOTES
End Of Shift Note  [facility] ICU  Summary of shift: Pt brought back on bipap. Rectal probe inserted to monitor temp. No bare hugger needed. Levo remains at 5. Precedex started for agitation and to help tolerate bipap. Precedex at 1 mcg. Ativan ordered every 2 hours PRN. 1:1 sitter at bed side. Pt pulling at mask and trying to get out of bed. Morning VBG improved. Potassium 5.8, Ca 7.6, Na 130 last check.     Vitals:    Vitals:    10/29/24 0630 10/29/24 0645 10/29/24 0700 10/29/24 0715   BP: (!) 86/60 96/67 107/77 104/65   Pulse: (!) 108 (!) 110 (!) 115 (!) 110   Resp: 20 20 27 20   Temp: 99.3 °F (37.4 °C) 99.3 °F (37.4 °C) 99.3 °F (37.4 °C) 99.3 °F (37.4 °C)   TempSrc:       SpO2: 96% 99% 95% 96%   Weight:       Height:            I&O:   Intake/Output Summary (Last 24 hours) at 10/29/2024 0735  Last data filed at 10/29/2024 0726  Gross per 24 hour   Intake 623.22 ml   Output 900 ml   Net -276.78 ml       Resp Status: Bipap    Ventilator Settings:     / / /FiO2 : 60 %    Critical Care IV infusions:   sodium chloride      dexmedeTOMIDine (PRECEDEX) 400 mcg in sodium chloride 0.9 % 100 mL infusion 1 mcg/kg/hr (10/29/24 0726)    norepinephrine 5 mcg/min (10/29/24 0726)        LDA:   Peripheral IV 10/28/24 Left Forearm (Active)   Number of days: 0       Peripheral IV 10/29/24 Right Antecubital (Active)   Number of days: 0       NG/OG/NJ/NE Tube Nasogastric 16 fr Right nostril (Active)   Number of days: 0       Urinary Catheter 10/28/24 Baxter (Active)   Number of days: 0

## 2024-10-29 NOTE — SIGNIFICANT EVENT
Intubation Procedure Note  Indication: impending respiratory failure    Consent: Unable to be obtained due to the emergent nature of this procedure.    Medications Used: etomidate intravenously and succinycholine intravenously    Procedure: The patient was placed in the appropriate position.  Cricoid pressure was not required.  Intubation was performed using glide scope and a 7.5 cuffed endotracheal tube.  The cuff was then inflated and the tube was secured appropriately at a distance of 26 cm to the dental ridge.  Initial confirmation of placement included bilateral breath sounds and an end tidal CO2 detector.  A chest x-ray to verify correct placement of the tube showed appropriate tube position.    The patient tolerated the procedure well.     Complications: None

## 2024-10-29 NOTE — PROGRESS NOTES
Pt pulled off bipap mask twice and is trying to get out of bed despite going up on precedex. Critical care messaged. Waiting for message to be read. Critical care called but no answer. Will try again. For now 1:1 sitter has been initiated.

## 2024-10-29 NOTE — CONSULTS
Consult Note    Reason for Consult: Electrolyte disorder, hyponatremia, hyperkalemia known history of lung cancer    Requesting Physician:  Dayna Haney MD    HISTORY OF PRESENT ILLNESS:    The patient is a 69 y.o. male who presents with weakness, lethargy, hypoxemia patient was noted to have elevated creatinine 1.7 sodium 127 initially currently 132 high potassium 5.8 patient is on oxygen right now has known history of lung cancer which has not been treated.  Patient has known history of type 2 diabetes baseline GFR is 46 mL from February 2024 and creatinine of 1.5.  Patient's current BNP was 4587 troponin was 41 myoglobin 285, chest x-ray shows pulmonary edema dense around opacity in the right midlung consistent with a mass, CT of the chest and abdomen and pelvis was also performed with IV contrast which shows large bilateral pleural effusions atelectasis liver was normal spleen was normal kidneys shows bilateral striated nephrogram with suspicion of pyelonephritis no hydronephrosis there is a exophytic renal sinus cyst noted adrenals were normal pancreas was normal no intrahepatic or extrahepatic ductal dilatation prostate gland is markedly enlarged and indents the bladder base also noted retroperitoneal adenopathy patient has elevated WBC count of 25,000 patient is currently on steroids, antibiotics    Review Of Systems:  Constitutional: No fever, chills, lethargy, weakness or wt loss.  HEENT:  No headache, nasal discharge or sore throat.  Cardiac:  No chest pain, dyspnea, orthopnea or PND.  Chest:              No cough, phlegm or wheezing.  Abdomen:  No abdominal pain, nausea, vomiting or diarrhea.  Neuro:  No gross focal weakness, numbness, abnormal movements or seizure like activity.  Skin:   No rashes or itching.  :   No hematuria, pyuria, dysuria or flank pain.  Extremities:  No swelling or joint pains.  Endocrine: No polyuria, polydypsia, or thyroid problems.  Hematology:    No bleeding disorders,

## 2024-10-29 NOTE — PROGRESS NOTES
Transitions of Care Pharmacy Service   Medication Review    The patient's list of current home medications has been reviewed.     Source(s) of information: SS, OARRS - pt unable to discuss home meds, called daughter who was unsure of home meds, also tried contacting patient's ex wife     Based on information provided by the above source(s), I have updated the patient's home med list as described below.     Please review the ACTION REQUESTED section of this note below for any discrepancies on current hospital orders.      I changed or updated the following medications on the patient's home medication list:  Removed Alprazolam   Diovan  Ibuprofen      Added Plavix  Dapagliflozin/metformin   Rosuvastatin  Tamsulosin   Cyclobenzaprine      Adjusted   Duloxetine - increased dose from 60 mg daily to 90 mg daily    Other Notes Updated med list to the best of my ability based on pharmacy fill history and OARRS reporting          PROVIDER ACTION REQUESTED  Medications that need to be addressed by a physician/nurse practitioner:    Current inpatient order(s) Action requested by pharmacy        No home meds have been resumed so far this admission please evaluate home med list and resume medications as clinically appropriate          Please feel free to call me with any questions about this encounter. Thank you.    CARLOS WILLINGHAM RPH   Transitions of Care Pharmacy Service  Phone:  [phone]  Fax: [phone]      Electronically signed by CARLOS WILLINGHAM RPH on 10/29/2024 at 2:55 PM           Prior to Admission medications    Medication Sig Start Date End Date Taking? Authorizing Provider   clopidogrel (PLAVIX) 75 MG tablet Take 1 tablet by mouth daily   Yes Rylee Butler MD   Dapagliflozin Pro-metFORMIN ER  MG TB24 Take 1 tablet by mouth daily   Yes Rylee Butler MD   Rosuvastatin Calcium 20 MG CPSP Take 1 tablet by mouth daily   Yes Rylee Butler MD   tamsulosin (FLOMAX) 0.4 MG capsule

## 2024-10-29 NOTE — CONSULTS
Pulmonary Medicine and Critical Care Consult    Patient - Rodríguez Krueger   MRN -  [id number]   [id number]   - 1955      Date of Admission -  10/28/2024  7:32 PM  Date of evaluation -  10/29/2024  Room - [address] [place] [address]   Hospital Day - 0  Consulting - Dayna Haney MD Primary Care Physician - Rupesh Thompson MD     Reason for Consult      Respiratory failure  Assessment   Acute hypoxic and hypercarbic respiratory failure  Continue oxygen BiPAP support  FiO2 40%  Precedex drip for agitation  N.p.o.    COPD exacerbation/pneumonia/sepsis  Xopenex Atrovent by nebulizer  Pulmicort 0.5 every 12  Solu-Medrol 40 IV every 8 hours  Rocephin doxycycline  Blood cultures    bilateral lower pleural effusion/atelectasis   Right thoracentesis by IR  Check pleural fluid culture cytology and chemistry  Lasix IV  Echocardiogram    lungs CA/lung mass  Oncology follow-up    INDIO/hyponatremia/Altaf nephritis?   Discussed with nephrology  Check urine culture     Coffee-ground NG output/gastric distention ?  Ileus  Monitor hemoglobin Protonix IV    discussed with RN and RT  Discussed with clinical pharmacist  Poor prognosis  Peptic ulcer disease prophylaxis  DVT prophylaxis    Problem List      Patient Active Problem List   Diagnosis    Hyponatremia       HPI     Rodríguez Krueger is 69 y.o.,  male, previous history of recent diagnosis lung cancer with lung mass history of diabetes hypertension depression peripheral vascular disease anxiety.  Patient presented emergency room for obtundation.  Patient appears to have advanced lung cancer, has not sought treatment for the past 7 months. Family went to check on him as he lives alone, they were able to get in so the door was broken down, the patient was severely weak and ill, not able to get out of bed.  He was hypoxic emergency room tachypneic he had to be placed on BiPAP. noted to have elevated creatinine 1.7 sodium 127 initially currently 132 high  supporting measures reviewed labs and imaging made decision accordingly

## 2024-10-29 NOTE — PROGRESS NOTES
BP (!) 90/56   Pulse 85   Resp 17   Wt 63.5 kg (140 lb)   SpO2 96%   BMI 18.99 kg/m²     Alert, Oriented, and Cooperative = 0    Diminshed bilaterally and/or crackles = 2    Continuous Telemetry monitoring Yes  Accessory muscle use No  Signs of discomfort No  Compliant with mask Yes  Skin integrity compromised No  ABG/VBG Yes      ABG to be drawn on Bipap 20/8 and 80%.

## 2024-10-29 NOTE — PROGRESS NOTES
I was contacted by RN.  Patient had sudden desaturation down to the 60s on BiPAP became more obtunded after receiving 0.5 of Ativan because he was getting agitated on high dose of Precedex.  He had to be intubated urgently by ER physician.  He had been on Levophed at 5 mcg initially was up to 9.  Advised fluid bolus.  I ordered sedation chest x-ray labs ABG.  Ordered PICC line and arterial line.  I came back to evaluate.  Patient having fevers above 100.  Ordered blood cultures x 2.  Will order repeat hemoglobin hematocrit.  He has received Lokelma for hyperkalemia will repeat potassium level check liver function tests.  Daughter is at bedside.  Patient's FiO2 is at 80% with 100% O2 saturation.  Discussed with daughter at bedside.  She wants aggressive measures.  She reports that patient's  and she has 2 half-brothers from his other wife Luma.  She also in contact with her mother updated her on the situation.  One of her half brothers has not been communicating with her dad.  I advised her concern for patient's poor prognosis with generalized weakness concern for ventilator dependency.  Discussed with RN      Devon Dumont MD  Pulmonary critical care and sleep medicine  Cc40 min

## 2024-10-29 NOTE — PLAN OF CARE
Problem: Respiratory - Adult  Goal: Achieves optimal ventilation and oxygenation  10/29/2024 0721 by Melly Randall RCP  Outcome: Progressing  Flowsheets (Taken 10/29/2024 0233 by Celestina Brennan RN)  Achieves optimal ventilation and oxygenation:   Assess for changes in respiratory status   Assess for changes in mentation and behavior   Position to facilitate oxygenation and minimize respiratory effort   Oxygen supplementation based on oxygen saturation or arterial blood gases   Assess the need for suctioning and aspirate as needed   Assess and instruct to report shortness of breath or any respiratory difficulty   Respiratory therapy support as indicated     Problem: Respiratory - Adult  Goal: Adequate oxygenation  Description: Adequate oxygenation  10/29/2024 0721 by Melly Randall RCP  Outcome: Progressing     Problem: Respiratory - Adult  Goal: Able to breathe comfortably  10/29/2024 0721 by Melly Randall RCP  Outcome: Progressing     Problem: Respiratory - Adult  Goal: Patient's breath sounds will be clear and equal  10/29/2024 0721 by Melly Randall RCP  Outcome: Progressing

## 2024-10-29 NOTE — ED PROVIDER NOTES
[facility] ED  eMERGENCY dEPARTMENTeNCOUnter      Pt Name: Rodríguez Krueger  MRN: [id number]  Birthdate 1955  Date ofevaluation: 10/28/2024  Provider: Gold Dotson PA-C    CHIEF COMPLAINT       Chief Complaint   Patient presents with    Fatigue     Pt unable to get up for a few days. Daughter called and EMS had to force entry. Pt has lung cancer. Pt does not wear O2 at home. Pt is a smoker. Pt was in low 80's.          HISTORY OF PRESENT ILLNESS  (Location/Symptom, Timing/Onset, Context/Setting, Quality, Duration, Modifying Factors, Severity.)   Rodríguez Krueger is a 69 y.o. male who presents to the emergency department with weakness shortness of breath.  Patient has lung cancer and has chosen not to have any current treatment.  Apparently he was not heard from for a couple days and the daughter reached out to try to talk with them and had a call the ambulance to do a well check.  Patient arrives hypoxic.  Oxygen level in the 80s.  Does not wear oxygen at home.      Nursing Notes were reviewed.    ALLERGIES     Erythromycin and Tetracyclines & related    CURRENT MEDICATIONS       Previous Medications    ALPRAZOLAM (XANAX) 1 MG TABLET    Take 1 mg by mouth 3 times daily as needed for Sleep.    DULOXETINE (CYMBALTA) 30 MG CAPSULE    Take 30 mg by mouth 2 times daily.    IBUPROFEN (ADVIL;MOTRIN) 800 MG TABLET    Take 1 tablet by mouth every 8 hours as needed for Pain.    OXYCODONE-ACETAMINOPHEN (PERCOCET)  MG PER TABLET    Take 1 tablet by mouth every 6 hours as needed for Pain.    VALSARTAN-HYDROCHLOROTHIAZIDE (DIOVAN HCT) 160-25 MG PER TABLET    Take 1 tablet by mouth daily.       PAST MEDICAL HISTORY         Diagnosis Date    Anxiety     Atherosclerosis of native arteries of left leg with ulceration of other part of foot (HCC)     Benign prostatic hyperplasia     Depression     Hypertension     Panic disorder     Rib fractures     Type 2 diabetes mellitus (HCC)        SURGICAL HISTORY    required mission to the hospital for pneumonia pleural effusion and new oxygen requirement. Venous blood gas improved bipap.        Is this patient to be included in the SEP-1 core measure due to severe sepsis or septic shock? Yes SEP-1 CORE MEASURE DATA      Sepsis Criteria   Severe Sepsis Criteria   Septic Shock Criteria       Must meet 2:    [x]Temp >100.9 F (38.3 C) or < 96.8 F (36 C)  []HR > 90  []RR > 20  [x]WBC > 12 or < 4 or 10% bands    AND:    [x] Infection Confirmed or Suspected.     Must meet 1:    [x]Lactate > 2       or   []Signs of Organ Dysfunction:    - SBP < 90 or MAP < 65  -Creatinine > 2 or increased from baseline  -Urine Output < 0.5 ml/kg/hr  -Bilirubin > 2  -INR > 1.5 (not anticoagulated)  -Platelets < 100,000  -Acute Respiratory Failure as evidenced by new need for NIPPV or mechanical ventilation   Must meet 1:    [x]Lactate > 4        or   []SBP < 90 or MAP < 65 for at least two readings in the first hour after fluid bolus administration    [x]Vasopressors initiated (if hypotension persists after fluid resuscitation)   Patient Vitals for the past 6 hrs:   BP Temp Pulse Resp SpO2 Weight Percent Weight Change   10/28/24 1936 (!) 92/52 -- 88 -- (!) 83 % -- --   10/28/24 1943 -- -- -- -- (!) 88 % -- --   10/28/24 1944 -- -- 88 22 (!) 87 % -- --   10/28/24 1945 (!) 118/40 -- 94 18 -- -- --   10/28/24 1947 -- -- -- -- -- 63.5 kg (140 lb) 0   10/28/24 2002 (!) 96/54 -- 94 16 (!) 87 % -- --   10/28/24 2005 -- -- 94 20 (!) 84 % -- --   10/28/24 2015 (!) 90/52 -- 94 24 (!) 80 % -- --   10/28/24 2026 -- -- 86 19 98 % -- --   10/28/24 2030 (!) 90/56 -- 85 17 96 % -- --   10/28/24 2043 -- -- 89 17 99 % -- --   10/28/24 2045 (!) 87/57 -- 89 18 98 % -- --   10/28/24 2059 -- -- 92 16 98 % -- --   10/28/24 2145 (!) 91/59 -- -- -- -- -- --   10/28/24 2152 -- -- 96 22 94 % -- --   10/28/24 2200 (!) 95/58 -- 94 22 95 % -- --   10/28/24 2215 (!) 86/57 -- 92 26 95 % -- --   10/28/24 2230 (!) 77/47 -- 96 23 95 %  98

## 2024-10-29 NOTE — PLAN OF CARE
Problem: Respiratory - Adult  Goal: Achieves optimal ventilation and oxygenation  Outcome: Progressing     Problem: Respiratory - Adult  Goal: Adequate oxygenation  Description: Adequate oxygenation  Outcome: Progressing     Problem: Respiratory - Adult  Goal: Able to breathe comfortably  Outcome: Progressing     Problem: Respiratory - Adult  Goal: Patient's breath sounds will be clear and equal  Outcome: Progressing           BRONCHOSPASM/BRONCHOCONSTRICTION    IMPROVE  AERATION/BREATHSOUNDS  ADMINISTER BRONCHODILATOR THERAPY AS APPROPRIATE  ASSESS BREATH SOUNDS  INITIATE AEROSOL PROTOCOL IF ORDERED TO DO SO  PATIENT EDUCATION AS NEEDED      PROVIDE ADEQUATE OXYGENATION WITH ACCEPTABLE SP02/ABG'S    [x]  IDENTIFY APPROPRIATE OXYGEN THERAPY  [x]   MONITOR SP02/ABG'S AS NEEDED   [x]   PATIENT EDUCATION AS NEEDED      NONINVASIVE VENTILATION    PROVIDE OPTIMAL VENTILATION/ACCEPTABLE SPO2   IMPLEMENT NONINVASIVE VENTILATION PROTOCOL   MAINTAIN ACCEPTABLE SPO2   ASSESS SKIN INTEGRITY/BREAKDOWN SCORE   PATIENT EDUCATION AS NEEDED   BIPAP AS NEEDED

## 2024-10-29 NOTE — PLAN OF CARE
Problem: Respiratory - Adult  Goal: Achieves optimal ventilation and oxygenation  10/29/2024 0742 by Celestina Brennan RN  Outcome: Progressing     Problem: Chronic Conditions and Co-morbidities  Goal: Patient's chronic conditions and co-morbidity symptoms are monitored and maintained or improved  Outcome: Progressing  Flowsheets (Taken 10/29/2024 0233)  Care Plan - Patient's Chronic Conditions and Co-Morbidity Symptoms are Monitored and Maintained or Improved:   Monitor and assess patient's chronic conditions and comorbid symptoms for stability, deterioration, or improvement   Collaborate with multidisciplinary team to address chronic and comorbid conditions and prevent exacerbation or deterioration   Update acute care plan with appropriate goals if chronic or comorbid symptoms are exacerbated and prevent overall improvement and discharge     Problem: Discharge Planning  Goal: Discharge to home or other facility with appropriate resources  Outcome: Progressing  Flowsheets (Taken 10/29/2024 0233)  Discharge to home or other facility with appropriate resources:   Identify barriers to discharge with patient and caregiver   Arrange for needed discharge resources and transportation as appropriate   Identify discharge learning needs (meds, wound care, etc)     Problem: Skin/Tissue Integrity  Goal: Absence of new skin breakdown  Description: 1.  Monitor for areas of redness and/or skin breakdown  2.  Assess vascular access sites hourly  3.  Every 4-6 hours minimum:  Change oxygen saturation probe site  4.  Every 4-6 hours:  If on nasal continuous positive airway pressure, respiratory therapy assess nares and determine need for appliance change or resting period.  Outcome: Progressing     Problem: Safety - Adult  Goal: Free from fall injury  Outcome: Progressing     Problem: Pain  Goal: Verbalizes/displays adequate comfort level or baseline comfort level  Outcome: Progressing

## 2024-10-29 NOTE — CARE COORDINATION
Discharge planning    Attempted to see. Patient confused on BIPAP. Sitter at bedside. PCT stated that daughter would be back in later today. Will attempt to see then

## 2024-10-29 NOTE — PROCEDURES
PROCEDURE NOTE  Date: 10/29/2024   Name: Rodríguez Krueger  YOB: 1955    Procedures  Picc placement note:  Dynamic Access RN procedure    Consent signed and obtained by proceduralist, from daughter POA. See consent form in paper chart.    Prescribed IV Therapy = Propofol, levophed, precedex, rocephin, electrolytes, vibramycin, ICU care  Peripheral ultrasound assessment done. Plan for left brachial vein insertion.   CVR measurement = 30 % (Linear CVR is preferred to be less than 45%).  Product type: Bard 5 fr TL lumen Power PICC.  History/Labs/Allergies Reviewed  Placed By: Alfie Good - RN (Dynamic Access)  Time out Performed using Two Identifiers  Lot # [id number]  Expiration date = 06/30/2025  Trimmed at 42 cm total  External catheter length 1 cm  Number of attempts 1  Special equipment used- Bard 3cg tip confirmation system, ultrasound, and micro-introducer (MST) technique   Catheter securement = adhesive 3M securement device  Dressing applied= Tegaderm CHG  Lidocaine administered intradermally conc.1%, approx 1 ml (Lidocaine Lot# - [id number] and Exp date - 08/01/2026 )    RN aware picc placed with ECG technology and is confirmed in the distal 1/3 SVC. Picc is immediately released for use. Rn aware new iv tubing required.     PICC education:     [ x ] Discussed with patient/Family or POA prior to procedure.  Risks and Benefits along with reason for procedure were discussed and teaching was reinforced with an education handout on line  insertion. [facility] FAQ Catheter Associated Blood Stream Infections and [facility] 84045 REV. 7/13 Nursing and Booklet left at bedside or in chart. Patient (Family or POA) acknowledged understanding of information taught and agreed to procedure.      [  ] Was not discussed with patient/family or POA due to pts medical status at time of procedure. pts family or POA not available to discuss line education. [facility] FAQ Catheter Associated Blood Stream Infections and [facility] 03561 REV. 7/13

## 2024-10-29 NOTE — PLAN OF CARE
Problem: Respiratory - Adult  Goal: Achieves optimal ventilation and oxygenation  10/29/2024 1919 by Lili Crum RCP  Outcome: Progressing     Problem: Respiratory - Adult  Goal: Adequate oxygenation  Description: Adequate oxygenation  10/29/2024 1919 by Lili Crum RCP  Outcome: Progressing     Problem: Respiratory - Adult  Goal: Able to breathe comfortably  10/29/2024 1919 by Lili Crum RCP  Outcome: Progressing     Problem: Respiratory - Adult  Goal: Patient's breath sounds will be clear and equal  10/29/2024 1919 by Lili Crum RCP  Outcome: Progressing

## 2024-10-30 ENCOUNTER — APPOINTMENT (OUTPATIENT)
Dept: GENERAL RADIOLOGY | Age: 69
End: 2024-10-30
Payer: MEDICARE

## 2024-10-30 PROBLEM — E43 SEVERE MALNUTRITION (HCC): Status: ACTIVE | Noted: 2024-10-30

## 2024-10-30 LAB
ALLEN TEST: ABNORMAL
ANION GAP SERPL CALCULATED.3IONS-SCNC: 18 MMOL/L (ref 9–17)
BASOPHILS # BLD: 0 K/UL (ref 0–0.2)
BASOPHILS NFR BLD: 0 %
BUN SERPL-MCNC: 72 MG/DL (ref 8–23)
BUN/CREAT SERPL: 55 (ref 9–20)
CALCIUM SERPL-MCNC: 7.7 MG/DL (ref 8.6–10.4)
CHLORIDE SERPL-SCNC: 95 MMOL/L (ref 98–107)
CO2 SERPL-SCNC: 24 MMOL/L (ref 20–31)
CREAT SERPL-MCNC: 1.3 MG/DL (ref 0.7–1.2)
CRP SERPL HS-MCNC: 63.4 MG/L (ref 0–5)
EKG ATRIAL RATE: 116 BPM
EKG P AXIS: 82 DEGREES
EKG P-R INTERVAL: 120 MS
EKG Q-T INTERVAL: 352 MS
EKG QRS DURATION: 88 MS
EKG QTC CALCULATION (BAZETT): 489 MS
EKG R AXIS: 79 DEGREES
EKG T AXIS: 118 DEGREES
EKG VENTRICULAR RATE: 116 BPM
EOSINOPHIL # BLD: 0 K/UL (ref 0–0.4)
EOSINOPHILS RELATIVE PERCENT: 0 % (ref 1–4)
ERYTHROCYTE [DISTWIDTH] IN BLOOD BY AUTOMATED COUNT: 17.9 % (ref 11.8–14.4)
ERYTHROCYTE [SEDIMENTATION RATE] IN BLOOD BY PHOTOMETRIC METHOD: 36 MM/HR (ref 0–20)
EST. AVERAGE GLUCOSE BLD GHB EST-MCNC: 174 MG/DL
FIO2: 40
GFR, ESTIMATED: 60 ML/MIN/1.73M2
GLUCOSE BLD-MCNC: 201 MG/DL (ref 75–110)
GLUCOSE BLD-MCNC: 228 MG/DL (ref 75–110)
GLUCOSE BLD-MCNC: 237 MG/DL (ref 75–110)
GLUCOSE BLD-MCNC: 255 MG/DL (ref 75–110)
GLUCOSE BLD-MCNC: 263 MG/DL (ref 75–110)
GLUCOSE SERPL-MCNC: 202 MG/DL (ref 70–99)
HBA1C MFR BLD: 7.7 % (ref 4–6)
HCT VFR BLD AUTO: 33.6 % (ref 40.7–50.3)
HGB BLD-MCNC: 10.2 G/DL (ref 13–17)
IMM GRANULOCYTES # BLD AUTO: 0.24 K/UL (ref 0–0.3)
IMM GRANULOCYTES NFR BLD: 1 %
LACTATE BLDV-SCNC: 3.3 MMOL/L (ref 0.5–1.9)
LYMPHOCYTES NFR BLD: 0 K/UL (ref 1–4.8)
LYMPHOCYTES RELATIVE PERCENT: 0 % (ref 24–44)
MCH RBC QN AUTO: 20.8 PG (ref 25.2–33.5)
MCHC RBC AUTO-ENTMCNC: 30.4 G/DL (ref 28.4–34.8)
MCV RBC AUTO: 68.4 FL (ref 82.6–102.9)
MODE: ABNORMAL
MONOCYTES NFR BLD: 0.48 K/UL (ref 0.2–0.8)
MONOCYTES NFR BLD: 2 % (ref 1–7)
MORPHOLOGY: ABNORMAL
NEUTROPHILS NFR BLD: 97 % (ref 36–66)
NEUTS SEG NFR BLD: 23.18 K/UL (ref 1.8–7.7)
NRBC BLD-RTO: 0.3 PER 100 WBC
O2 DELIVERY DEVICE: ABNORMAL
PATIENT TEMP: 38
PLATELET # BLD AUTO: 273 K/UL (ref 138–453)
PMV BLD AUTO: 9.9 FL (ref 8.1–13.5)
POC HCO3: 26.5 MMOL/L (ref 21–28)
POC O2 SATURATION: 95.8 % (ref 94–98)
POC PCO2 TEMP: 36.5 MM HG
POC PCO2: 35 MM HG (ref 35–48)
POC PH TEMP: 7.47
POC PH: 7.49 (ref 7.35–7.45)
POC PO2 TEMP: 78.2 MM HG
POC PO2: 73.1 MM HG (ref 83–108)
POSITIVE BASE EXCESS, ART: 3.3 MMOL/L (ref 0–3)
POTASSIUM SERPL-SCNC: 4.4 MMOL/L (ref 3.7–5.3)
RBC # BLD AUTO: 4.91 M/UL (ref 4.21–5.77)
SAMPLE SITE: ABNORMAL
SODIUM SERPL-SCNC: 137 MMOL/L (ref 135–144)
SODIUM SERPL-SCNC: 138 MMOL/L (ref 135–144)
TRIGL SERPL-MCNC: 155 MG/DL
WBC OTHER # BLD: 23.9 K/UL (ref 3.5–11.3)

## 2024-10-30 PROCEDURE — 2580000003 HC RX 258: Performed by: NURSE PRACTITIONER

## 2024-10-30 PROCEDURE — 85652 RBC SED RATE AUTOMATED: CPT

## 2024-10-30 PROCEDURE — 6370000000 HC RX 637 (ALT 250 FOR IP): Performed by: NURSE PRACTITIONER

## 2024-10-30 PROCEDURE — 2580000003 HC RX 258: Performed by: INTERNAL MEDICINE

## 2024-10-30 PROCEDURE — 6360000002 HC RX W HCPCS: Performed by: INTERNAL MEDICINE

## 2024-10-30 PROCEDURE — 84478 ASSAY OF TRIGLYCERIDES: CPT

## 2024-10-30 PROCEDURE — 94640 AIRWAY INHALATION TREATMENT: CPT

## 2024-10-30 PROCEDURE — 2000000000 HC ICU R&B

## 2024-10-30 PROCEDURE — 94003 VENT MGMT INPAT SUBQ DAY: CPT

## 2024-10-30 PROCEDURE — 6370000000 HC RX 637 (ALT 250 FOR IP): Performed by: INTERNAL MEDICINE

## 2024-10-30 PROCEDURE — 84295 ASSAY OF SERUM SODIUM: CPT

## 2024-10-30 PROCEDURE — 82803 BLOOD GASES ANY COMBINATION: CPT

## 2024-10-30 PROCEDURE — 2580000003 HC RX 258: Performed by: FAMILY MEDICINE

## 2024-10-30 PROCEDURE — 71045 X-RAY EXAM CHEST 1 VIEW: CPT

## 2024-10-30 PROCEDURE — 2500000003 HC RX 250 WO HCPCS: Performed by: PHYSICIAN ASSISTANT

## 2024-10-30 PROCEDURE — 2500000003 HC RX 250 WO HCPCS: Performed by: INTERNAL MEDICINE

## 2024-10-30 PROCEDURE — 99223 1ST HOSP IP/OBS HIGH 75: CPT | Performed by: NURSE PRACTITIONER

## 2024-10-30 PROCEDURE — 86140 C-REACTIVE PROTEIN: CPT

## 2024-10-30 PROCEDURE — 80048 BASIC METABOLIC PNL TOTAL CA: CPT

## 2024-10-30 PROCEDURE — 93005 ELECTROCARDIOGRAM TRACING: CPT | Performed by: INTERNAL MEDICINE

## 2024-10-30 PROCEDURE — 6360000002 HC RX W HCPCS: Performed by: NURSE PRACTITIONER

## 2024-10-30 PROCEDURE — 94761 N-INVAS EAR/PLS OXIMETRY MLT: CPT

## 2024-10-30 PROCEDURE — 93010 ELECTROCARDIOGRAM REPORT: CPT | Performed by: INTERNAL MEDICINE

## 2024-10-30 PROCEDURE — 6360000002 HC RX W HCPCS: Performed by: FAMILY MEDICINE

## 2024-10-30 PROCEDURE — 2700000000 HC OXYGEN THERAPY PER DAY

## 2024-10-30 PROCEDURE — 82947 ASSAY GLUCOSE BLOOD QUANT: CPT

## 2024-10-30 PROCEDURE — 37799 UNLISTED PX VASCULAR SURGERY: CPT

## 2024-10-30 PROCEDURE — 83036 HEMOGLOBIN GLYCOSYLATED A1C: CPT

## 2024-10-30 PROCEDURE — 83605 ASSAY OF LACTIC ACID: CPT

## 2024-10-30 PROCEDURE — 85025 COMPLETE CBC W/AUTO DIFF WBC: CPT

## 2024-10-30 PROCEDURE — 2580000003 HC RX 258: Performed by: PHYSICIAN ASSISTANT

## 2024-10-30 RX ORDER — GAUZE BANDAGE 2" X 2"
100 BANDAGE TOPICAL DAILY
Status: DISCONTINUED | OUTPATIENT
Start: 2024-10-30 | End: 2024-10-30

## 2024-10-30 RX ORDER — GAUZE BANDAGE 2" X 2"
100 BANDAGE TOPICAL DAILY
Status: DISCONTINUED | OUTPATIENT
Start: 2024-10-30 | End: 2024-11-01 | Stop reason: HOSPADM

## 2024-10-30 RX ORDER — FOLIC ACID 1 MG/1
1 TABLET ORAL DAILY
Status: DISCONTINUED | OUTPATIENT
Start: 2024-10-31 | End: 2024-11-01 | Stop reason: HOSPADM

## 2024-10-30 RX ORDER — DEXTROSE MONOHYDRATE 100 MG/ML
INJECTION, SOLUTION INTRAVENOUS CONTINUOUS PRN
Status: DISCONTINUED | OUTPATIENT
Start: 2024-10-30 | End: 2024-11-01 | Stop reason: HOSPADM

## 2024-10-30 RX ORDER — INSULIN LISPRO 100 [IU]/ML
0-4 INJECTION, SOLUTION INTRAVENOUS; SUBCUTANEOUS EVERY 6 HOURS SCHEDULED
Status: DISCONTINUED | OUTPATIENT
Start: 2024-10-30 | End: 2024-11-01 | Stop reason: HOSPADM

## 2024-10-30 RX ORDER — FENTANYL CITRATE 0.05 MG/ML
25 INJECTION, SOLUTION INTRAMUSCULAR; INTRAVENOUS
Status: DISCONTINUED | OUTPATIENT
Start: 2024-10-30 | End: 2024-10-31

## 2024-10-30 RX ORDER — 0.9 % SODIUM CHLORIDE 0.9 %
500 INTRAVENOUS SOLUTION INTRAVENOUS ONCE
Status: COMPLETED | OUTPATIENT
Start: 2024-10-30 | End: 2024-10-30

## 2024-10-30 RX ADMIN — FOLIC ACID 1 MG: 1 TABLET ORAL at 10:51

## 2024-10-30 RX ADMIN — SODIUM CHLORIDE, PRESERVATIVE FREE 10 ML: 5 INJECTION INTRAVENOUS at 13:59

## 2024-10-30 RX ADMIN — SODIUM CHLORIDE, PRESERVATIVE FREE 10 ML: 5 INJECTION INTRAVENOUS at 18:30

## 2024-10-30 RX ADMIN — DOXYCYCLINE 100 MG: 100 INJECTION, POWDER, LYOPHILIZED, FOR SOLUTION INTRAVENOUS at 10:36

## 2024-10-30 RX ADMIN — DOXYCYCLINE 100 MG: 100 INJECTION, POWDER, LYOPHILIZED, FOR SOLUTION INTRAVENOUS at 20:19

## 2024-10-30 RX ADMIN — BUDESONIDE 500 MCG: 0.5 SUSPENSION RESPIRATORY (INHALATION) at 19:43

## 2024-10-30 RX ADMIN — PROPOFOL 25 MCG/KG/MIN: 10 INJECTION, EMULSION INTRAVENOUS at 20:53

## 2024-10-30 RX ADMIN — VASOPRESSIN 0.03 UNITS/MIN: 20 INJECTION INTRAVENOUS at 22:46

## 2024-10-30 RX ADMIN — BUDESONIDE 500 MCG: 0.5 SUSPENSION RESPIRATORY (INHALATION) at 07:18

## 2024-10-30 RX ADMIN — PIPERACILLIN AND TAZOBACTAM 3375 MG: 3; .375 INJECTION, POWDER, LYOPHILIZED, FOR SOLUTION INTRAVENOUS at 18:47

## 2024-10-30 RX ADMIN — INSULIN LISPRO 1 UNITS: 100 INJECTION, SOLUTION INTRAVENOUS; SUBCUTANEOUS at 12:33

## 2024-10-30 RX ADMIN — SODIUM CHLORIDE 500 ML: 9 INJECTION, SOLUTION INTRAVENOUS at 14:33

## 2024-10-30 RX ADMIN — PROPOFOL 40 MCG/KG/MIN: 10 INJECTION, EMULSION INTRAVENOUS at 03:44

## 2024-10-30 RX ADMIN — LEVALBUTEROL 1.25 MG: 1.25 SOLUTION, CONCENTRATE RESPIRATORY (INHALATION) at 14:53

## 2024-10-30 RX ADMIN — INSULIN LISPRO 2 UNITS: 100 INJECTION, SOLUTION INTRAVENOUS; SUBCUTANEOUS at 18:27

## 2024-10-30 RX ADMIN — WATER 60 MG: 1 INJECTION INTRAMUSCULAR; INTRAVENOUS; SUBCUTANEOUS at 18:37

## 2024-10-30 RX ADMIN — SODIUM CHLORIDE, PRESERVATIVE FREE 40 MG: 5 INJECTION INTRAVENOUS at 05:21

## 2024-10-30 RX ADMIN — SODIUM CHLORIDE, PRESERVATIVE FREE 10 ML: 5 INJECTION INTRAVENOUS at 11:46

## 2024-10-30 RX ADMIN — PIPERACILLIN AND TAZOBACTAM 4500 MG: 4; .5 INJECTION, POWDER, LYOPHILIZED, FOR SOLUTION INTRAVENOUS at 12:40

## 2024-10-30 RX ADMIN — Medication 100 MG: at 19:56

## 2024-10-30 RX ADMIN — LEVALBUTEROL 1.25 MG: 1.25 SOLUTION, CONCENTRATE RESPIRATORY (INHALATION) at 10:47

## 2024-10-30 RX ADMIN — ACETAMINOPHEN 650 MG: 650 SUPPOSITORY RECTAL at 19:56

## 2024-10-30 RX ADMIN — SODIUM CHLORIDE, PRESERVATIVE FREE 10 ML: 5 INJECTION INTRAVENOUS at 10:37

## 2024-10-30 RX ADMIN — IPRATROPIUM BROMIDE 0.5 MG: 0.5 SOLUTION RESPIRATORY (INHALATION) at 14:53

## 2024-10-30 RX ADMIN — ISODIUM CHLORIDE 3 ML: 0.03 SOLUTION RESPIRATORY (INHALATION) at 10:47

## 2024-10-30 RX ADMIN — LEVALBUTEROL 1.25 MG: 1.25 SOLUTION, CONCENTRATE RESPIRATORY (INHALATION) at 19:43

## 2024-10-30 RX ADMIN — SODIUM CHLORIDE, PRESERVATIVE FREE 10 ML: 5 INJECTION INTRAVENOUS at 19:52

## 2024-10-30 RX ADMIN — WATER 60 MG: 1 INJECTION INTRAMUSCULAR; INTRAVENOUS; SUBCUTANEOUS at 03:08

## 2024-10-30 RX ADMIN — NOREPINEPHRINE BITARTRATE 10 MCG/MIN: 0.06 INJECTION, SOLUTION INTRAVENOUS at 06:30

## 2024-10-30 RX ADMIN — DEXMEDETOMIDINE HYDROCHLORIDE 0.8 MCG/KG/HR: 100 INJECTION, SOLUTION INTRAVENOUS at 23:28

## 2024-10-30 RX ADMIN — VASOPRESSIN 0.03 UNITS/MIN: 20 INJECTION INTRAVENOUS at 02:56

## 2024-10-30 RX ADMIN — DEXMEDETOMIDINE 0.3 MCG/KG/HR: 100 INJECTION, SOLUTION INTRAVENOUS at 02:57

## 2024-10-30 RX ADMIN — FUROSEMIDE 20 MG: 10 INJECTION, SOLUTION INTRAMUSCULAR; INTRAVENOUS at 10:37

## 2024-10-30 RX ADMIN — FENTANYL CITRATE 25 MCG: 0.05 INJECTION, SOLUTION INTRAMUSCULAR; INTRAVENOUS at 11:37

## 2024-10-30 RX ADMIN — WATER 60 MG: 1 INJECTION INTRAMUSCULAR; INTRAVENOUS; SUBCUTANEOUS at 11:45

## 2024-10-30 RX ADMIN — ACETAMINOPHEN 650 MG: 650 SUPPOSITORY RECTAL at 05:21

## 2024-10-30 RX ADMIN — VASOPRESSIN 0.03 UNITS/MIN: 20 INJECTION INTRAVENOUS at 11:44

## 2024-10-30 RX ADMIN — SODIUM CHLORIDE, PRESERVATIVE FREE 40 MG: 5 INJECTION INTRAVENOUS at 18:28

## 2024-10-30 RX ADMIN — IPRATROPIUM BROMIDE 0.5 MG: 0.5 SOLUTION RESPIRATORY (INHALATION) at 19:43

## 2024-10-30 RX ADMIN — PROPOFOL 20 MCG/KG/MIN: 10 INJECTION, EMULSION INTRAVENOUS at 12:28

## 2024-10-30 RX ADMIN — ENOXAPARIN SODIUM 40 MG: 100 INJECTION SUBCUTANEOUS at 10:27

## 2024-10-30 RX ADMIN — LEVALBUTEROL 1.25 MG: 1.25 SOLUTION, CONCENTRATE RESPIRATORY (INHALATION) at 07:18

## 2024-10-30 ASSESSMENT — PAIN SCALES - GENERAL
PAINLEVEL_OUTOF10: 0

## 2024-10-30 ASSESSMENT — PULMONARY FUNCTION TESTS
PIF_VALUE: 28
PIF_VALUE: 22
PIF_VALUE: 25
PIF_VALUE: 23
PIF_VALUE: 23
PIF_VALUE: 27
PIF_VALUE: 27
PIF_VALUE: 23

## 2024-10-30 NOTE — PROGRESS NOTES
Reason for Follow up: Electrolyte abnormalities and renal dysfunction.    HISTORY:      Patient is intubated.  Received bolus of IV fluids last night.  Reported to have good urine output.  No involuntary movement.  Has an NG tube that has not been used.      All other ROS is negative.      Scheduled Meds:   thiamine mononitrate  100 mg Oral Daily    sodium chloride flush  5-40 mL IntraVENous 2 times per day    enoxaparin  40 mg SubCUTAneous Daily    cefTRIAXone (ROCEPHIN) IV  1,000 mg IntraVENous Q24H    doxycycline (VIBRAMYCIN) IV  100 mg IntraVENous Q12H    levalbuterol  1.25 mg Nebulization 4x Daily RT    budesonide  0.5 mg Nebulization BID RT    methylPREDNISolone  60 mg IntraVENous Q8H    furosemide  20 mg IntraVENous BID    pantoprazole (PROTONIX) 40 mg in sodium chloride (PF) 0.9 % 10 mL injection  40 mg IntraVENous Q12H    folic acid  1 mg Oral Daily    sodium chloride flush  5-40 mL IntraVENous 2 times per day   Continuous Infusions:   VASOpressin 20 Units in sodium chloride 0.9 % 100 mL infusion 0.03 Units/min (10/30/24 0256)    phenylephrine (NICKI-SYNEPHRINE) 50 mg in sodium chloride 0.9 % 250 mL infusion      sodium chloride      dexmedeTOMIDine (PRECEDEX) 400 mcg in sodium chloride 0.9 % 100 mL infusion 0.3 mcg/kg/hr (10/30/24 0257)    propofol 40 mcg/kg/min (10/30/24 0344)    norepinephrine 10 mcg/min (10/30/24 0630)     PRN Meds:sodium chloride flush, sodium chloride, potassium chloride **OR** potassium chloride, magnesium sulfate, ondansetron **OR** ondansetron, polyethylene glycol, acetaminophen **OR** acetaminophen, LORazepam, fentanNYL, sodium chloride nebulizer, sodium chloride flush, sodium chloride flush    Allergies   Allergen Reactions    Erythromycin     Tetracyclines & Related      Pt tolerated doxyxycline on 10/29/2024.       Physical Exam:  Blood pressure 121/61, pulse (!) 109, temperature 100.4 °F (38 °C), resp. rate 20, height 1.829 m (6'), weight 60.6 kg (133 lb 8.2 oz), SpO2 95%.  In:

## 2024-10-30 NOTE — PROGRESS NOTES
Spiritual Health History and Assessment/Progress Note  [facility]    (P) Spiritual/Emotional Needs (CPE Training), (P) Family Care,  ,      Name: Rodríguez Krueger MRN: [id number]    Age: 69 y.o.     Sex: male   Language: English   Tenriism: Hoahaoism   Hyponatremia     Date: 10/30/2024            Total Time Calculated: (P) 15 min              Spiritual Assessment continued in [facility] ICU        Referral/Consult From: (P) Rounding   Encounter Overview/Reason: (P) Spiritual/Emotional Needs (CPE Training)  Service Provided For: (P) Patient and family together    Rose Marie, Belief, Meaning:   Patient identifies as spiritual, is connected with a rose marie tradition or spiritual practice, and has beliefs or practices that help with coping during difficult times  Family/Friends identify as spiritual, are connected with a rose marie tradition or spiritual practice, and have beliefs or practices that help with coping during difficult times      Importance and Influence:  Patient has spiritual/personal beliefs that influence decisions regarding their health  Family/Friends have spiritual/personal beliefs that influence decisions regarding the patient's health    Community:  Patient is connected with a spiritual community  Family/Friends are connected with a spiritual community:    Assessment and Plan of Care:     Patient Interventions include: Facilitated expression of thoughts and feelings, Engaged in theological reflection, and Provided sacramental/Taoism ritual  Family/Friends Interventions include: Facilitated expression of thoughts and feelings and Provided sacramental/Taoism ritual    Patient Plan of Care: Spiritual Care available upon further referral  Family/Friends Plan of Care: Spiritual Care available upon further referral    Electronically signed by Chaplain Antonio on 10/30/2024 at 3:18 PM       10/30/24 1511   Encounter Summary   Encounter Overview/Reason Spiritual/Emotional Needs  (CPE Training)

## 2024-10-30 NOTE — CONSULTS
Consultation Note  Foot and Ankle Surgery     Subjective     Chief Complaint: Left foot wound    HPI:  Rodríguez Krueger is a 69 y.o. male diabetic patient seen at [facility] for foot wounds to the left lower extremity.  The patient is currently intubated and sedated.  Patient was brought to [facility] on 10/28/2024 via EMS after his family checked on him and he was feeling severely weak and ill.  Patient reportedly has advanced lung cancer that he did not pursue treatment for.  Podiatry was consulted for concern of osteomyelitis of the left hallux on x-ray and discoloration of digits clinically.  HPI obtained from patient's chart, patient himself cannot communicate and no family present at bedside.      PCP is Rupesh Thompson MD     ROS:   Review of Systems   Unable to perform ROS: Intubated       Past Medical History   has a past medical history of Anxiety, Atherosclerosis of native arteries of left leg with ulceration of other part of foot (HCC), Benign prostatic hyperplasia, Depression, Hypertension, Panic disorder, Rib fractures, and Type 2 diabetes mellitus (HCC).    Past Surgical History   has a past surgical history that includes Shoulder arthroscopy w/ rotator cuff repair (Right, 01/12/2022) and Cholecystectomy.    Medications  Prior to Admission medications    Medication Sig Start Date End Date Taking? Authorizing Provider   clopidogrel (PLAVIX) 75 MG tablet Take 1 tablet by mouth daily   Yes Rylee Butler MD   Dapagliflozin Pro-metFORMIN ER  MG TB24 Take 1 tablet by mouth daily   Yes Rylee Butler MD   Rosuvastatin Calcium 20 MG CPSP Take 1 tablet by mouth daily   Yes Rylee Butler MD   tamsulosin (FLOMAX) 0.4 MG capsule Take 2 capsules by mouth daily   Yes Rylee Butler MD   cyclobenzaprine (FLEXERIL) 10 MG tablet Take 1 tablet by mouth 3 times daily as needed for Muscle spasms   Yes Rylee Butler MD   DULoxetine (CYMBALTA) 30 MG capsule Take 3  ARM 12 ML     Culture NO GROWTH 1 DAY    Culture, Blood 1 [[id number]] Collected: 10/28/24 1950    Order Status: Completed Specimen: Blood Updated: 10/29/24 2256     Specimen Description .BLOOD     Special Requests LFA 2ML     Culture NO GROWTH 1 DAY             Assessment     Rodríguez Krueger is a 69 y.o. male with   Dry gangrene, left foot  Diabetes mellitus type 2  Peripheral arterial disease    Principal Problem:    Hyponatremia  Resolved Problems:    * No resolved hospital problems. *        Plan     Patient examined and evaluated at bedside.   Unable to discuss treatment plans with patient due to intubation status  Plain film radiographs ordered.  Concern for osteomyelitis in the distal hallux    Plan: No debridement was performed at this time due to patient's compromised vascular status.  Wound was painted with Betadine and loosely wrapped with Kerlix secured with tape.  No surgical intervention at this time, local wound care only. Patient to follow up with Dr. Trevino within 1 week of discharge. Patient to leave dressing clean, dry, and intact until follow up appt. Podiatry to sign off at this time. Follow up and discharge instructions in chart. Please page on call resident with any questions.   Nursing order to change dressings every other day  Please continue to keep heels elevated at all times    WBAT to left lower extremity  Discussed with  Dr. Uriel Khan DPM   Foot and Ankle Surgery  10/30/2024 at 12:39 PM

## 2024-10-30 NOTE — PROGRESS NOTES
Progress note  [facility] [employer]    Adult Hospitalist      Name: Rodríguez Krueger  MRN: [id number]     Acct: [id number]  Room: [address] [place] [address]    Admit Date: 10/28/2024  7:32 PM  PCP: Rupesh Thompson MD    Primary Problem  Principal Problem:    Hyponatremia  Active Problems:    Severe malnutrition (HCC)  Resolved Problems:    * No resolved hospital problems. *        Assesment/ plan:     Acute hypoxemic respiratory failure  Patient was initially put on BiPAP/high flow nasal cannula oxygen, further patient did require Precedex for agitation eventually was intubated on 10/29/2024  Currently Requiring full ventilatory support  Critical care consult    Bilateral pleural effusion/pulmonary edema:  IR is consulted for right-sided thoracocentesis  IV Lasix  Echocardiogram    Acute COPD exacerbation:  IV Solu-Medrol  Xopenex/Atrovent breathing treatment    Acute bacterial pneumonia/septicemia/bilateral pyelonephritis:  Urine culture  Blood culture  IV Rocephin  IV Vibramycin    Lung cancer/lung mass:  Patient to follow-up with oncology as an outpatient    Coffee-ground NG output:  IV Protonix  Consult gastroenterology  Continue Lovenox for lactulose if drop in hemoglobin can be held or discontinued    Acute kidney injury/hyponatremia:  Nephrology following    Left foot dry gangrene:  Podiatry have seen patient and they recommended local wound care treatment for wound, they are not recommending any surgical intervention at this time recommending close follow-up as an outpatient      Continue to monitor/telemetry/CBC with differential daily/BMP daily  DVT and GI prophylaxis.  Continue medications as below      Scheduled Meds:   thiamine mononitrate  100 mg Oral Daily    ipratropium  0.5 mg Nebulization 4x Daily RT    piperacillin-tazobactam  3,375 mg IntraVENous Q8H    insulin lispro  0-4 Units SubCUTAneous 4 times per day    sodium chloride  500 mL IntraVENous Once    sodium chloride flush  5-40 mL IntraVENous 2 times

## 2024-10-30 NOTE — PROGRESS NOTES
10/28 urine sample (collected 10/28/24 at 2237, updated 10/28/24 at 2302).  Called [facility] (10/30/24 at 1430) for update on results- was informed that lab tech Janey is processing urine samples currently and will have a 24 hr update in next 1-2 hrs.    -Roselia Barajas PharmD, BCPS  Phone:  [phone]

## 2024-10-30 NOTE — CONSULTS
Clinical Ethics Consultation Note    History and Context:  Rodríguez Krueger is a 69 y.o. male whose current LOS is 1 days and [facility].      Currently, pt is intubated and sedated.  Pt brought in via EMS yesterday after family checked on him and he was \"severely weak and ill.\"  Pt reportedly has \"advanced lung cancer\" that he did not pursue treatment on.  Pt originally on bipap and precedex for agitation, but was emergently intubated after significantly worsening.     Pt does not have advanced directives on file.     Pt's decision maker is next of kin.  The relationship to the pt is his children.  Pt reportedly has three children: Judy Krueger, Phuc Krueger, and Dev Krueger.     Pt's current code status is full.       Admit Date & Time: 10/28/2024  7:32 PM    Length of Stay: 1  Valid medical advanced directives?: No    Process:  Writer spoke to medical team and reviewed chart.    Ethical Question(s) and Concern(s):  Clarify decision maker and patient wishes    Analysis:  Pt's daughter was at bedside yesterday and wished to pursue \"aggressive treatment.\"  She reportedly is concerned because one of pt's children has been estranged from him for a significant time.  Medical staff reportedly told her that due to not having advance directives, [place] law states his 3 children will be decision makers.    Pt was full code upon admission.  Pt has advanced lung cancer and reportedly did not want to pursue cancer treatment.  The patient has not had the capacity to share wishes or decision making during this admission due to illness.  Family reports that pt was private about his diagnosis and his overall wishes were not shared with them.    Recommendations:  Consult palliative to discuss treatment and speak to decision makers about goals of care, given pt's choice not to pursue treatment for cancer.    All children must be contacted reasonably regarding pt and have equal decision making ability, unless

## 2024-10-30 NOTE — CONSULTS
Palliative Care Inpatient Consult    NAME:  Rodríguez Krueger  MEDICAL RECORD NUMBER:  [id number]  AGE: 69 y.o.   GENDER: male  : 1955  TODAY'S DATE:  10/30/2024    Reasons for Consultation:    Symptom and/or pain management  Provision of information regarding PC and/or hospice philosophies  Complex, time-intensive communication and interdisciplinary psychosocial support  Clarification of goals of care and/or assistance with difficult decision-making  Guidance in regards to resources and transition(s)    Members of PC team contributing to this consultation are: LEIGH ANN Santiago    Plan      Palliative Interaction:    Patient evaluated this afternoon. No family present at bedside. Update received from patient's RN. There are three children. Patient's daughter and mother were present earlier today.     I called patient's daughter Julius and introduced myself and my role with palliative care. She confirms that she was at the bedside earlier today. We discussed events leading to his hospitalization. She tells me that he \"was not doing too good. He had trouble getting around because of his feet.\" Until his current hospitalization, she was unaware that he had stage IV cancer. She states that he was very private about his health and did not share with her his diagnosis. She did report noticing some weight loss recently. Julius confirms that patient is not currently . He has been  twice. He has three total children. She went to his home to look for any documents for a will and was unable to find any.     I discussed plan of care with her. Explained that he is very ill and requiring multiple medications for his blood pressure. This is added to his stage IV lung cancer that he did not seek treatment for. She is hopeful that he will be extubated to discuss his wishes. I discussed code status at length with her and explained differences between FULL, DNR-CCA, DNR-CC. Additionally explained to

## 2024-10-30 NOTE — PROGRESS NOTES
Spiritual Health History and Assessment/Progress Note  [facility]    (P) Spiritual/Emotional Needs, Initial Encounter,  ,  ,      Name: Rodríguez Krueger MRN: [id number]    Age: 69 y.o.     Sex: male   Language: English   Alevism: Caodaism   Hyponatremia     Date: 10/30/2024            Total Time Calculated: (P) 9 min              Spiritual Assessment began in [facility]        Referral/Consult From: (P) Rounding   Encounter Overview/Reason: (P) Spiritual/Emotional Needs, Initial Encounter  Service Provided For: (P) Patient and family together    Rose Marie, Belief, Meaning:   Patient unable to assess at this time  Family/Friends have beliefs or practices that help with coping during difficult times      Importance and Influence:  Patient unable to assess at this time  Family/Friends have spiritual/personal beliefs that influence decisions regarding the patient's health    Community:  Patient feels well-supported. Support system includes: Children  Family/Friends feel well-supported. Support system includes: Other: family members    Assessment and Plan of Care:     Patient Interventions include: Other: supportive pastoral presence and prayer  Family/Friends Interventions include: Facilitated expression of thoughts and feelings, Explored spiritual coping/struggle/distress, and Affirmed coping skills/support systems    Patient Plan of Care: Spiritual Care available upon further referral  Family/Friends Plan of Care: Spiritual Care available upon further referral    Electronically signed by Chaplain Pankaj on 10/30/2024 at 10:52 AM

## 2024-10-30 NOTE — PROGRESS NOTES
Pt had stable BP at 10 of Levo. Pt turned and pressures dropped. Writer went up on Levo and HR increased to 130s but not sustaining. Critical Care reached out to and writer got orders to check a 12 lead EKG, start Vasopressin, if that doesn't help then start Bogdan. When weaning wean Levo first, Bogdan 2nd, then Vas 3rd.

## 2024-10-30 NOTE — PROGRESS NOTES
End Of Shift Note  [place] [facility]  Summary of shift: Vaso started, Levo continued, propofol continued, precedex weaned off, FiO2 weaned to 40%, pt continues to have elevated temperatures at 100.4. 1,275 mls urine output. WBC 23.9, Lactic 2.7. EKG showed Sinus tach. NG clamped.    Vitals:    Vitals:    10/30/24 0615 10/30/24 0630 10/30/24 0645 10/30/24 0718   BP:       Pulse: (!) 118 (!) 109 (!) 123 (!) 110   Resp: (!) 31 20 20 20   Temp: 100.2 °F (37.9 °C) 100.4 °F (38 °C) 100.4 °F (38 °C)    TempSrc:       SpO2: 96% 95% 96% 97%   Weight:       Height:            I&O:   Intake/Output Summary (Last 24 hours) at 10/30/2024 0739  Last data filed at 10/30/2024 0600  Gross per 24 hour   Intake 774.33 ml   Output 3025 ml   Net -2250.67 ml       Resp Status: Intubated    Ventilator Settings:  Vent Mode: AC/PRVC Resp Rate (Set): 20 bpm/Vt (Set, mL): 500 mL/ /FiO2 : 40 %    Critical Care IV infusions:   VASOpressin 20 Units in sodium chloride 0.9 % 100 mL infusion 0.03 Units/min (10/30/24 0256)    phenylephrine (NICKI-SYNEPHRINE) 50 mg in sodium chloride 0.9 % 250 mL infusion      dextrose 5 % 1,000 mL infusion      sodium chloride      dexmedeTOMIDine (PRECEDEX) 400 mcg in sodium chloride 0.9 % 100 mL infusion 0.3 mcg/kg/hr (10/30/24 0257)    propofol 40 mcg/kg/min (10/30/24 0344)    norepinephrine 10 mcg/min (10/30/24 0630)        LDA:   PICC 10/29/24 Left Brachial (Active)   Number of days: 0       Peripheral IV 10/28/24 Left Forearm (Active)   Number of days: 1       Peripheral IV 10/29/24 Right Antecubital (Active)   Number of days: 1       NG/OG/NJ/NE Tube Nasogastric 16 fr Right nostril (Active)   Number of days: 1       Urinary Catheter 10/28/24 Baxter (Active)   Number of days: 1       ETT  (Active)   Number of days: 0       Arterial Line 10/29/24 Right Radial (Active)   Number of days: 0

## 2024-10-30 NOTE — CONSULTS
GASTROENTEROLOGY CONSULT       REASON FOR CONSULT:  Coffee ground NG output    REQUESTING PHYSICIAN:  Dayna Haney MD    HISTORY OF PRESENT ILLNESS:    The patient is a 69 y.o. male who presented to ED 2 days ago with increasing weakness, lethargy, hypoxia, ending up on ventilator after poor sats on bipap, INDIO hyponatremic.   PMH of diabetes and lung cancer discovered months back that has not been treated.   CXR showed pulmonary edema dense around opacity in the right midlung consistent with a mass, CT of the chest and abdomen and pelvis was also performed with IV contrast which shows large bilateral pleural effusions atelectasis liver was normal spleen was normal kidneys shows bilateral striated nephrogram with suspicion of pyelonephritis no hydronephrosis there is a exophytic renal sinus cyst noted adrenals were normal pancreas was normal no intrahepatic or extrahepatic ductal dilatation prostate gland is markedly enlarged and indents the bladder base also noted retroperitoneal adenopathy patient has elevated WBC count of 25,000.   Patient did have some CG return yesterday in , not very evident today. Thus, GI was consulted.   No stools, no melena no hematochezia. I spoke with CHRIS Tejada at bedside for some time.   Appears main issue is code status, family decisions, ethics is now involved.   Patient is on vasopressin and Levophed. .  Tube feeds not infusing.   Patient was already started on BID IV PPI.   No family present at bedside.   Unable to communicate with patient he is sedated on vent, quite frail and ill appearing.     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

## 2024-10-30 NOTE — PLAN OF CARE
Problem: Respiratory - Adult  Goal: Achieves optimal ventilation and oxygenation  10/30/2024 1942 by Lili Crum RCP  Outcome: Progressing     Problem: Respiratory - Adult  Goal: Adequate oxygenation  Description: Adequate oxygenation  10/30/2024 1942 by Lili Crum RCP  Outcome: Progressing     Problem: Respiratory - Adult  Goal: Patient's breath sounds will be clear and equal  10/30/2024 1942 by Lili Crum RCP  Outcome: Progressing

## 2024-10-30 NOTE — PLAN OF CARE
Problem: Respiratory - Adult  Goal: Achieves optimal ventilation and oxygenation  10/30/2024 0725 by Qian Granados RCP  Outcome: Progressing  Flowsheets  Taken 10/30/2024 0352 by Lili Crum RCP  Achieves optimal ventilation and oxygenation:   Assess for changes in respiratory status   Oxygen supplementation based on oxygen saturation or arterial blood gases   Assess the need for suctioning and aspirate as needed   Respiratory therapy support as indicated  Taken 10/30/2024 0018 by Lili Crum RCP  Achieves optimal ventilation and oxygenation:   Assess for changes in respiratory status   Oxygen supplementation based on oxygen saturation or arterial blood gases   Assess the need for suctioning and aspirate as needed   Respiratory therapy support as indicated  Taken 10/29/2024 2000 by Lili Crum RCP  Achieves optimal ventilation and oxygenation:   Assess for changes in respiratory status   Oxygen supplementation based on oxygen saturation or arterial blood gases   Assess the need for suctioning and aspirate as needed   Respiratory therapy support as indicated  10/29/2024 1919 by Lili Crum RCP  Outcome: Progressing  10/29/2024 1750 by Rosario Lopez RN  Outcome: Progressing  Flowsheets (Taken 10/29/2024 1600)  Achieves optimal ventilation and oxygenation:   Assess for changes in respiratory status   Assess for changes in mentation and behavior   Position to facilitate oxygenation and minimize respiratory effort   Oxygen supplementation based on oxygen saturation or arterial blood gases  Goal: Adequate oxygenation  Description: Adequate oxygenation  10/30/2024 0725 by Qian Granados RCP  Outcome: Progressing  10/29/2024 1919 by Lili Crum RCP  Outcome: Progressing  Goal: Able to breathe comfortably  10/30/2024 0725 by Qian Granados RCP  Outcome: Progressing  10/29/2024 1919 by Lili Crum RCP  Outcome: Progressing  Goal: Patient's breath sounds will

## 2024-10-30 NOTE — PROGRESS NOTES
Comprehensive Nutrition Assessment    Type and Reason for Visit:  Consult (Tube feeding order and management)    Nutrition Recommendations/Plan:   NPO diet  Vital 1.5 Doug at 42 mL/hr (1008 mL/hr) and Proteinex 1x/day. Water flush 30 mL every 4 hours  Monitor tube feeding rate, GI function, vent status and labs     Malnutrition Assessment:  Malnutrition Status:  Severe malnutrition (10/30/24 1415)    Context:  Chronic Illness     Findings of the 6 clinical characteristics of malnutrition:  Energy Intake:  75% or less estimated energy requirements for 1 month or longer  Weight Loss:  Unable to assess     Body Fat Loss:  Severe body fat loss Triceps   Muscle Mass Loss:  Severe muscle mass loss Temples (temporalis), Clavicles (pectoralis & deltoids)  Fluid Accumulation:  Mild Extremities   Strength:  Not Performed    Nutrition Assessment:    Patient admission is related to hypoxemia, hyponatremia, septicemia and pleural effusion. Patient became confused on BiPap and began to desat requiring intubation yesterday afternoon (10/29). Patient has a medical history for type 2 diabetes mellitus, osteomyelitis and necrotic toes. Patient was diagnosed with lung cancer November 2023 but patient decided not to have cancer treatments. Patient has signs of fat and muscle mass loss which indicate malnutrition.Patient remains intubated and sedated on Propofol. Tube feeding order and management consult received for mechanical vent patient. Recommend Vital 1.5 Doug goal rate 42 mL/hr (1008 mL total volume) and Proteinex once per day. Monitor tube feeding rate, GI function, vent status and labs.    Nutrition Related Findings:    Edema: +1 BLE. Hypoactive bowel sounds. NG tube. Black feet, necrotic toe and lft foot osteomyelitis, Lung cancer Wound Type: None       Current Nutrition Intake & Therapies:    Average Meal Intake: NPO  Average Supplements Intake: NPO  Diet NPO  ADULT TUBE FEEDING; Nasogastric; Other Tube Feeding (specify);

## 2024-10-30 NOTE — PLAN OF CARE
Problem: Respiratory - Adult  Goal: Achieves optimal ventilation and oxygenation  10/30/2024 1743 by Mallory Verde RN  Outcome: Progressing  10/30/2024 0737 by Celestina Brennan RN  Outcome: Progressing  10/30/2024 0725 by Qian Granados RCP  Outcome: Progressing  Flowsheets  Taken 10/30/2024 0352 by Lili Crum RCP  Achieves optimal ventilation and oxygenation:   Assess for changes in respiratory status   Oxygen supplementation based on oxygen saturation or arterial blood gases   Assess the need for suctioning and aspirate as needed   Respiratory therapy support as indicated  Taken 10/30/2024 0018 by Lili Crum RCP  Achieves optimal ventilation and oxygenation:   Assess for changes in respiratory status   Oxygen supplementation based on oxygen saturation or arterial blood gases   Assess the need for suctioning and aspirate as needed   Respiratory therapy support as indicated  Taken 10/29/2024 2000 by Lili Crum RCP  Achieves optimal ventilation and oxygenation:   Assess for changes in respiratory status   Oxygen supplementation based on oxygen saturation or arterial blood gases   Assess the need for suctioning and aspirate as needed   Respiratory therapy support as indicated  Goal: Adequate oxygenation  Description: Adequate oxygenation  10/30/2024 0725 by Qian Granados RCP  Outcome: Progressing  Goal: Able to breathe comfortably  10/30/2024 0725 by Qian Granados RCP  Outcome: Progressing  Goal: Patient's breath sounds will be clear and equal  10/30/2024 0725 by Qian Granados RCP  Outcome: Progressing     Problem: Chronic Conditions and Co-morbidities  Goal: Patient's chronic conditions and co-morbidity symptoms are monitored and maintained or improved  10/30/2024 1743 by Mallory Verde RN  Outcome: Progressing  10/30/2024 0737 by Celestina Brennan RN  Outcome: Progressing  Flowsheets (Taken 10/29/2024 2000)  Care Plan - Patient's Chronic Conditions and

## 2024-10-30 NOTE — PLAN OF CARE
Problem: Respiratory - Adult  Goal: Achieves optimal ventilation and oxygenation  10/30/2024 0737 by Celestina Brennan, RN  Outcome: Progressing  10/30/2024 0725 by Qian Granados RCP  Outcome: Progressing  Flowsheets  Taken 10/30/2024 0352 by Lili Crum RCP  Achieves optimal ventilation and oxygenation:   Assess for changes in respiratory status   Oxygen supplementation based on oxygen saturation or arterial blood gases   Assess the need for suctioning and aspirate as needed   Respiratory therapy support as indicated  Taken 10/30/2024 0018 by Lili Crum RCP  Achieves optimal ventilation and oxygenation:   Assess for changes in respiratory status   Oxygen supplementation based on oxygen saturation or arterial blood gases   Assess the need for suctioning and aspirate as needed   Respiratory therapy support as indicated  Taken 10/29/2024 2000 by Lili Crum RCP  Achieves optimal ventilation and oxygenation:   Assess for changes in respiratory status   Oxygen supplementation based on oxygen saturation or arterial blood gases   Assess the need for suctioning and aspirate as needed   Respiratory therapy support as indicated  10/29/2024 1919 by Lili Crum RCP  Outcome: Progressing  10/29/2024 1750 by Rosario Lopez, RN  Outcome: Progressing  Flowsheets (Taken 10/29/2024 1600)  Achieves optimal ventilation and oxygenation:   Assess for changes in respiratory status   Assess for changes in mentation and behavior   Position to facilitate oxygenation and minimize respiratory effort   Oxygen supplementation based on oxygen saturation or arterial blood gases  Goal: Adequate oxygenation  Description: Adequate oxygenation  10/30/2024 0725 by Qian Granados RCP  Outcome: Progressing  10/29/2024 1919 by Lili Crum RCP  Outcome: Progressing  Goal: Able to breathe comfortably  10/30/2024 0725 by Qian Granados RCP  Outcome: Progressing  10/29/2024 1919 by Lili Crum  regarding the reason for restraint   Every 2 hours: Monitor safety, psychosocial status, comfort, nutrition and hydration  10/29/2024 1750 by Rosario Lopez RN  Outcome: Progressing  Flowsheets  Taken 10/29/2024 1600  Remains free of injury from restraints (restraint for interference with medical device):   Determine that other, less restrictive measures have been tried or would not be effective before applying the restraint   Evaluate the patient's condition at the time of restraint application   Inform patient/family regarding the reason for restraint   Every 2 hours: Monitor safety, psychosocial status, comfort, nutrition and hydration  Taken 10/29/2024 1515  Remains free of injury from restraints (restraint for interference with medical device):   Determine that other, less restrictive measures have been tried or would not be effective before applying the restraint   Evaluate the patient's condition at the time of restraint application   Inform patient/family regarding the reason for restraint   Every 2 hours: Monitor safety, psychosocial status, comfort, nutrition and hydration  Taken 10/29/2024 1415  Remains free of injury from restraints (restraint for interference with medical device):   Determine that other, less restrictive measures have been tried or would not be effective before applying the restraint   Evaluate the patient's condition at the time of restraint application   Inform patient/family regarding the reason for restraint   Every 2 hours: Monitor safety, psychosocial status, comfort, nutrition and hydration     Problem: ABCDS Injury Assessment  Goal: Absence of physical injury  10/30/2024 0737 by Celestina Brennan RN  Outcome: Progressing  10/29/2024 1750 by Rosario Lopez RN  Outcome: Progressing

## 2024-10-30 NOTE — PROGRESS NOTES
Pulmonary Critical Care Progress Note    Patient seen for the follow up of Hyponatremia     Subjective:    Patient had significant hypotension following sedation yesterday.  I ordered fluid bolus.  I had to add vasopressin 0.03.  He had tachycardia overnight advised to use fentanyl IV push check EKG and start Bogdan-Synephrine if needed if tachycardia persist.  Heart rate still around 120-130 sinus.  He is unresponsive on propofol gets agitated not following commands.    Examination:    Vitals: /61   Pulse (!) 128   Temp (!) 100.6 °F (38.1 °C)   Resp 20   Ht 1.829 m (6')   Wt 60.6 kg (133 lb 8.2 oz)   SpO2 98%   BMI 18.11 kg/m²   SpO2  Av.9 %  Min: 66 %  Max: 100 %  General appearance: Intubated sedated  Neck: No JVD  Lungs: Decreased breath sound no crackles or wheezing  Heart: regular rate and rhythm, S1, S2 normal, no gallop  Abdomen: Soft, non tender, + BS  Extremities: no cyanosis or clubbing. No significant edema chronic left foot ischemic toe changes    LABs:    CBC:   Recent Labs     10/28/24  2001 10/29/24  1835 10/30/24  0601   WBC 25.3*  --  23.9*   HGB 10.1* 10.5* 10.2*   HCT 35.8* 33.4* 33.6*     --  273     BMP:   Recent Labs     10/29/24  1411 10/29/24  1835 10/29/24  2018 10/30/24  0240 10/30/24  0601   *  --    < > 138 137   K 5.3 4.7  --   --  4.4   CO2   --   --   --     BUN 75*  --   --   --  72*   CREATININE 1.7*  --   --   --  1.3*   LABGLOM 43*  --   --   --  60*   GLUCOSE 164*  --   --   --  202*    < > = values in this interval not displayed.     PT/INR:   Recent Labs     10/28/24  2001   PROTIME 17.6*   INR 1.5     APTT:  Recent Labs     10/28/24  2001   APTT 30.7     LIVER PROFILE:  Recent Labs     10/29/24  1835   AST 96*   *       Radiology:    Chest x-ray 10/30 reviewed  There is some improved aeration seen in the right lung base, otherwise  similar appearing chest with bilateral effusions and multilobar parenchymal  ground-glass and consolidative  infiltrates.     Impression/recommendations:    Acute hypoxic and hypercarbic respiratory failure  Assist-control ventilation  Precedex wean last  Sedation with propofol wean first  Fentanyl 25 every hour as needed pain     COPD exacerbation/pneumonia  Xopenex Atrovent by nebulizer  Pulmicort 0.5 every 12  Solu-Medrol 40 IV every 8 hours  Switch Rocephin to Zosyn continue doxycycline  Blood cultures  Sputum culture     bilateral lower pleural effusion/atelectasis   Right thoracentesis by IR  Check pleural fluid culture cytology and chemistry  Lasix IV  Echocardiogram     Sepsis/septic shock   Wean off Levophed to MAP above 65  Vasopressin 0.03/min wean last  Discontinue Lasix  Follow-up lactic acid    lungs CA/lung mass  Oncology follow-up     INDIO/hyponatremia/Altaf nephritis?   Discussed with nephrology  Check urine culture      Coffee-ground NG output/gastric distention ?  Ileus  Monitor hemoglobin Protonix IV     Increased blood sugar   Insulin scale/monitor blood sugar    left foot ischemic toe/osteomyelitis on x-ray   Antibiotics as above  Consult podiatry    History of alcohol abuse daughter reports he quit  Start thiamine folate    discussed with RN and RT  Discussed with clinical pharmacist  Poor prognosis  Peptic ulcer disease prophylaxis  DVT prophylaxis      Devon Dumont MD, MD, [facility]  Pulmonary Critical Care and Sleep Medicine,  [facility]  Cell: [phone]  Office: [phone]  Cc35 min  This patient with critical illness required highest level of physician preparedness to intervene urgently.  I managed life/organ supporting measures reviewed labs and imaging made decision accordingly

## 2024-10-31 ENCOUNTER — APPOINTMENT (OUTPATIENT)
Dept: GENERAL RADIOLOGY | Age: 69
End: 2024-10-31
Payer: MEDICARE

## 2024-10-31 PROBLEM — R00.0 SINUS TACHYCARDIA: Status: ACTIVE | Noted: 2024-10-31

## 2024-10-31 PROBLEM — Z71.89 DNR (DO NOT RESUSCITATE) DISCUSSION: Status: ACTIVE | Noted: 2024-10-31

## 2024-10-31 PROBLEM — Z51.5 PALLIATIVE CARE ENCOUNTER: Status: ACTIVE | Noted: 2024-10-31

## 2024-10-31 PROBLEM — Z71.89 ACP (ADVANCE CARE PLANNING): Status: ACTIVE | Noted: 2024-10-31

## 2024-10-31 LAB
ANION GAP SERPL CALCULATED.3IONS-SCNC: 16 MMOL/L (ref 9–17)
BUN SERPL-MCNC: 66 MG/DL (ref 8–23)
BUN/CREAT SERPL: 60 (ref 9–20)
CALCIUM SERPL-MCNC: 7.8 MG/DL (ref 8.6–10.4)
CHLORIDE SERPL-SCNC: 98 MMOL/L (ref 98–107)
CO2 SERPL-SCNC: 27 MMOL/L (ref 20–31)
CREAT SERPL-MCNC: 1.1 MG/DL (ref 0.7–1.2)
EKG ATRIAL RATE: 118 BPM
EKG P AXIS: 77 DEGREES
EKG P-R INTERVAL: 126 MS
EKG Q-T INTERVAL: 326 MS
EKG QRS DURATION: 94 MS
EKG QTC CALCULATION (BAZETT): 456 MS
EKG R AXIS: 81 DEGREES
EKG T AXIS: -85 DEGREES
EKG VENTRICULAR RATE: 118 BPM
FIO2: 40
GFR, ESTIMATED: 73 ML/MIN/1.73M2
GLUCOSE BLD-MCNC: 262 MG/DL (ref 75–110)
GLUCOSE BLD-MCNC: 289 MG/DL (ref 75–110)
GLUCOSE BLD-MCNC: 304 MG/DL (ref 75–110)
GLUCOSE BLD-MCNC: 366 MG/DL (ref 75–110)
GLUCOSE SERPL-MCNC: 320 MG/DL (ref 70–99)
MAGNESIUM SERPL-MCNC: 2.3 MG/DL (ref 1.6–2.6)
MICROORGANISM SPEC CULT: NORMAL
MICROORGANISM/AGENT SPEC: NORMAL
MODE: ABNORMAL
O2 DELIVERY DEVICE: ABNORMAL
POC HCO3: 29.7 MMOL/L (ref 21–28)
POC O2 SATURATION: 95.9 % (ref 94–98)
POC PCO2: 39.2 MM HG (ref 35–48)
POC PH: 7.49 (ref 7.35–7.45)
POC PO2: 75.1 MM HG (ref 83–108)
POSITIVE BASE EXCESS, ART: 5.9 MMOL/L (ref 0–3)
POTASSIUM SERPL-SCNC: 3.6 MMOL/L (ref 3.7–5.3)
SAMPLE SITE: ABNORMAL
SERVICE CMNT-IMP: NORMAL
SODIUM SERPL-SCNC: 141 MMOL/L (ref 135–144)
SPECIMEN DESCRIPTION: NORMAL

## 2024-10-31 PROCEDURE — 2700000000 HC OXYGEN THERAPY PER DAY

## 2024-10-31 PROCEDURE — 6360000002 HC RX W HCPCS: Performed by: FAMILY MEDICINE

## 2024-10-31 PROCEDURE — 37799 UNLISTED PX VASCULAR SURGERY: CPT

## 2024-10-31 PROCEDURE — 2580000003 HC RX 258: Performed by: INTERNAL MEDICINE

## 2024-10-31 PROCEDURE — 94003 VENT MGMT INPAT SUBQ DAY: CPT

## 2024-10-31 PROCEDURE — 2000000000 HC ICU R&B

## 2024-10-31 PROCEDURE — 82803 BLOOD GASES ANY COMBINATION: CPT

## 2024-10-31 PROCEDURE — 6360000002 HC RX W HCPCS: Performed by: NURSE PRACTITIONER

## 2024-10-31 PROCEDURE — 2500000003 HC RX 250 WO HCPCS: Performed by: INTERNAL MEDICINE

## 2024-10-31 PROCEDURE — 99233 SBSQ HOSP IP/OBS HIGH 50: CPT | Performed by: NURSE PRACTITIONER

## 2024-10-31 PROCEDURE — 6370000000 HC RX 637 (ALT 250 FOR IP): Performed by: INTERNAL MEDICINE

## 2024-10-31 PROCEDURE — 83735 ASSAY OF MAGNESIUM: CPT

## 2024-10-31 PROCEDURE — 6360000002 HC RX W HCPCS: Performed by: INTERNAL MEDICINE

## 2024-10-31 PROCEDURE — 6370000000 HC RX 637 (ALT 250 FOR IP): Performed by: NURSE PRACTITIONER

## 2024-10-31 PROCEDURE — 82947 ASSAY GLUCOSE BLOOD QUANT: CPT

## 2024-10-31 PROCEDURE — 6360000002 HC RX W HCPCS

## 2024-10-31 PROCEDURE — 71045 X-RAY EXAM CHEST 1 VIEW: CPT

## 2024-10-31 PROCEDURE — 99291 CRITICAL CARE FIRST HOUR: CPT

## 2024-10-31 PROCEDURE — 94640 AIRWAY INHALATION TREATMENT: CPT

## 2024-10-31 PROCEDURE — 80048 BASIC METABOLIC PNL TOTAL CA: CPT

## 2024-10-31 PROCEDURE — 94761 N-INVAS EAR/PLS OXIMETRY MLT: CPT

## 2024-10-31 PROCEDURE — 2580000003 HC RX 258: Performed by: FAMILY MEDICINE

## 2024-10-31 RX ORDER — HYDROMORPHONE HYDROCHLORIDE 1 MG/ML
1 INJECTION, SOLUTION INTRAMUSCULAR; INTRAVENOUS; SUBCUTANEOUS
Status: DISCONTINUED | OUTPATIENT
Start: 2024-10-31 | End: 2024-11-01 | Stop reason: HOSPADM

## 2024-10-31 RX ORDER — HALOPERIDOL 5 MG/ML
1 INJECTION INTRAMUSCULAR
Status: DISCONTINUED | OUTPATIENT
Start: 2024-10-31 | End: 2024-11-01 | Stop reason: HOSPADM

## 2024-10-31 RX ORDER — LORAZEPAM 2 MG/ML
2 INJECTION INTRAMUSCULAR
Status: DISCONTINUED | OUTPATIENT
Start: 2024-10-31 | End: 2024-11-01 | Stop reason: HOSPADM

## 2024-10-31 RX ORDER — GLYCOPYRROLATE 0.2 MG/ML
0.2 INJECTION INTRAMUSCULAR; INTRAVENOUS EVERY 4 HOURS PRN
Status: DISCONTINUED | OUTPATIENT
Start: 2024-10-31 | End: 2024-11-01 | Stop reason: HOSPADM

## 2024-10-31 RX ORDER — HYDROMORPHONE HYDROCHLORIDE 1 MG/ML
2 INJECTION, SOLUTION INTRAMUSCULAR; INTRAVENOUS; SUBCUTANEOUS
Status: DISCONTINUED | OUTPATIENT
Start: 2024-10-31 | End: 2024-11-01 | Stop reason: HOSPADM

## 2024-10-31 RX ORDER — LORAZEPAM 2 MG/ML
1 INJECTION INTRAMUSCULAR
Status: DISCONTINUED | OUTPATIENT
Start: 2024-10-31 | End: 2024-11-01 | Stop reason: HOSPADM

## 2024-10-31 RX ORDER — MAGNESIUM SULFATE 1 G/100ML
1000 INJECTION INTRAVENOUS ONCE
Status: COMPLETED | OUTPATIENT
Start: 2024-10-31 | End: 2024-10-31

## 2024-10-31 RX ORDER — HYDROMORPHONE HYDROCHLORIDE 1 MG/ML
1 INJECTION, SOLUTION INTRAMUSCULAR; INTRAVENOUS; SUBCUTANEOUS
Status: DISCONTINUED | OUTPATIENT
Start: 2024-10-31 | End: 2024-10-31

## 2024-10-31 RX ADMIN — PIPERACILLIN AND TAZOBACTAM 3375 MG: 3; .375 INJECTION, POWDER, LYOPHILIZED, FOR SOLUTION INTRAVENOUS at 02:09

## 2024-10-31 RX ADMIN — GLYCOPYRROLATE 0.2 MG: 0.2 INJECTION INTRAMUSCULAR; INTRAVENOUS at 11:20

## 2024-10-31 RX ADMIN — HYDROMORPHONE HYDROCHLORIDE 2 MG: 1 INJECTION, SOLUTION INTRAMUSCULAR; INTRAVENOUS; SUBCUTANEOUS at 14:57

## 2024-10-31 RX ADMIN — VASOPRESSIN 0.03 UNITS/MIN: 20 INJECTION INTRAVENOUS at 09:46

## 2024-10-31 RX ADMIN — SODIUM CHLORIDE, PRESERVATIVE FREE 40 MG: 5 INJECTION INTRAVENOUS at 06:29

## 2024-10-31 RX ADMIN — LORAZEPAM 1 MG: 2 INJECTION INTRAMUSCULAR; INTRAVENOUS at 17:15

## 2024-10-31 RX ADMIN — HYDROMORPHONE HYDROCHLORIDE 2 MG: 1 INJECTION, SOLUTION INTRAMUSCULAR; INTRAVENOUS; SUBCUTANEOUS at 13:51

## 2024-10-31 RX ADMIN — LORAZEPAM 2 MG: 2 INJECTION, SOLUTION INTRAMUSCULAR; INTRAVENOUS at 13:27

## 2024-10-31 RX ADMIN — LORAZEPAM 2 MG: 2 INJECTION, SOLUTION INTRAMUSCULAR; INTRAVENOUS at 11:28

## 2024-10-31 RX ADMIN — LEVALBUTEROL 1.25 MG: 1.25 SOLUTION, CONCENTRATE RESPIRATORY (INHALATION) at 07:55

## 2024-10-31 RX ADMIN — HYDROMORPHONE HYDROCHLORIDE 2 MG: 1 INJECTION, SOLUTION INTRAMUSCULAR; INTRAVENOUS; SUBCUTANEOUS at 13:21

## 2024-10-31 RX ADMIN — HYDROMORPHONE HYDROCHLORIDE 1 MG: 1 INJECTION, SOLUTION INTRAMUSCULAR; INTRAVENOUS; SUBCUTANEOUS at 11:24

## 2024-10-31 RX ADMIN — HYDROMORPHONE HYDROCHLORIDE 2 MG: 1 INJECTION, SOLUTION INTRAMUSCULAR; INTRAVENOUS; SUBCUTANEOUS at 19:41

## 2024-10-31 RX ADMIN — IPRATROPIUM BROMIDE 0.5 MG: 0.5 SOLUTION RESPIRATORY (INHALATION) at 10:41

## 2024-10-31 RX ADMIN — ACETAMINOPHEN 650 MG: 650 SUPPOSITORY RECTAL at 02:21

## 2024-10-31 RX ADMIN — FENTANYL CITRATE 25 MCG: 0.05 INJECTION, SOLUTION INTRAMUSCULAR; INTRAVENOUS at 07:31

## 2024-10-31 RX ADMIN — BUDESONIDE 500 MCG: 0.5 SUSPENSION RESPIRATORY (INHALATION) at 07:55

## 2024-10-31 RX ADMIN — HYDROMORPHONE HYDROCHLORIDE 2 MG: 1 INJECTION, SOLUTION INTRAMUSCULAR; INTRAVENOUS; SUBCUTANEOUS at 18:52

## 2024-10-31 RX ADMIN — HYDROMORPHONE HYDROCHLORIDE 2 MG: 1 INJECTION, SOLUTION INTRAMUSCULAR; INTRAVENOUS; SUBCUTANEOUS at 21:39

## 2024-10-31 RX ADMIN — MAGNESIUM SULFATE HEPTAHYDRATE 1000 MG: 1 INJECTION, SOLUTION INTRAVENOUS at 03:36

## 2024-10-31 RX ADMIN — HYDROMORPHONE HYDROCHLORIDE 2 MG: 1 INJECTION, SOLUTION INTRAMUSCULAR; INTRAVENOUS; SUBCUTANEOUS at 17:15

## 2024-10-31 RX ADMIN — HYDROMORPHONE HYDROCHLORIDE 2 MG: 1 INJECTION, SOLUTION INTRAMUSCULAR; INTRAVENOUS; SUBCUTANEOUS at 23:32

## 2024-10-31 RX ADMIN — INSULIN LISPRO 3 UNITS: 100 INJECTION, SOLUTION INTRAVENOUS; SUBCUTANEOUS at 01:23

## 2024-10-31 RX ADMIN — DEXMEDETOMIDINE HYDROCHLORIDE 1.2 MCG/KG/HR: 100 INJECTION, SOLUTION INTRAVENOUS at 08:35

## 2024-10-31 RX ADMIN — IPRATROPIUM BROMIDE 0.5 MG: 0.5 SOLUTION RESPIRATORY (INHALATION) at 07:55

## 2024-10-31 RX ADMIN — INSULIN LISPRO 4 UNITS: 100 INJECTION, SOLUTION INTRAVENOUS; SUBCUTANEOUS at 06:29

## 2024-10-31 RX ADMIN — LEVALBUTEROL 1.25 MG: 1.25 SOLUTION, CONCENTRATE RESPIRATORY (INHALATION) at 10:41

## 2024-10-31 RX ADMIN — PROPOFOL 30 MCG/KG/MIN: 10 INJECTION, EMULSION INTRAVENOUS at 06:33

## 2024-10-31 RX ADMIN — WATER 60 MG: 1 INJECTION INTRAMUSCULAR; INTRAVENOUS; SUBCUTANEOUS at 02:15

## 2024-10-31 ASSESSMENT — PULMONARY FUNCTION TESTS
PIF_VALUE: 25
PIF_VALUE: 23
PIF_VALUE: 23
PIF_VALUE: 26
PIF_VALUE: 26
PIF_VALUE: 24
PIF_VALUE: 26
PIF_VALUE: 24
PIF_VALUE: 26
PIF_VALUE: 26
PIF_VALUE: 24
PIF_VALUE: 20
PIF_VALUE: 32
PIF_VALUE: 25
PIF_VALUE: 24
PIF_VALUE: 23
PIF_VALUE: 24
PIF_VALUE: 23
PIF_VALUE: 23
PIF_VALUE: 25
PIF_VALUE: 24
PIF_VALUE: 24
PIF_VALUE: 23
PIF_VALUE: 23
PIF_VALUE: 26
PIF_VALUE: 14
PIF_VALUE: 24
PIF_VALUE: 23

## 2024-10-31 ASSESSMENT — PAIN SCALES - GENERAL
PAINLEVEL_OUTOF10: 6
PAINLEVEL_OUTOF10: 2
PAINLEVEL_OUTOF10: 0
PAINLEVEL_OUTOF10: 7
PAINLEVEL_OUTOF10: 6

## 2024-10-31 NOTE — PLAN OF CARE
Problem: Respiratory - Adult  Goal: Achieves optimal ventilation and oxygenation  10/31/2024 0050 by Tianna Pozo, RN  Outcome: Progressing  Flowsheets  Taken 10/30/2024 2342 by Lili Crum RCP  Achieves optimal ventilation and oxygenation:   Assess for changes in respiratory status   Oxygen supplementation based on oxygen saturation or arterial blood gases   Assess the need for suctioning and aspirate as needed   Respiratory therapy support as indicated  Taken 10/30/2024 2000 by Tianna Pozo RN  Achieves optimal ventilation and oxygenation:   Assess for changes in respiratory status   Assess for changes in mentation and behavior   Position to facilitate oxygenation and minimize respiratory effort  Taken 10/30/2024 1943 by Lili Crum RCP  Achieves optimal ventilation and oxygenation:   Assess for changes in respiratory status   Oxygen supplementation based on oxygen saturation or arterial blood gases   Assess the need for suctioning and aspirate as needed   Respiratory therapy support as indicated  10/30/2024 1942 by Lili Crum RCP  Outcome: Progressing  10/30/2024 1743 by Mallory Verde RN  Outcome: Progressing  Flowsheets (Taken 10/30/2024 1000)  Achieves optimal ventilation and oxygenation:   Assess for changes in respiratory status   Assess for changes in mentation and behavior   Position to facilitate oxygenation and minimize respiratory effort   Oxygen supplementation based on oxygen saturation or arterial blood gases   Encourage broncho-pulmonary hygiene including cough, deep breathe, incentive spirometry   Assess the need for suctioning and aspirate as needed   Assess and instruct to report shortness of breath or any respiratory difficulty   Respiratory therapy support as indicated  Goal: Adequate oxygenation  Description: Adequate oxygenation  10/30/2024 1942 by Lili Crum RCP  Outcome: Progressing  Goal: Able to breathe comfortably  10/30/2024 1942 by Radames  reason for restraint   Every 2 hours: Monitor safety, psychosocial status, comfort, nutrition and hydration     Problem: ABCDS Injury Assessment  Goal: Absence of physical injury  10/31/2024 0050 by Tianna Pozo RN  Outcome: Progressing  Flowsheets (Taken 10/30/2024 2117)  Absence of Physical Injury: Implement safety measures based on patient assessment  10/30/2024 1743 by Mallory Verde RN  Outcome: Progressing     Problem: Nutrition Deficit:  Goal: Optimize nutritional status  10/31/2024 0050 by Tianna Pozo RN  Outcome: Progressing  10/30/2024 1743 by Mallory Verde RN  Outcome: Progressing     Problem: Decision Making  Goal: Pt/Family able to effectively weigh alternatives and participate in decision making related to treatment and care  Description: INTERVENTIONS:  1. Determine when there are differences between patient's view, family's view, and healthcare provider's view of condition  2. Facilitate patient and family articulation of goals for care  3. Help patient and family identify pros/cons of alternative solutions  4. Provide information as requested by patient/family  5. Respect patient/family right to receive or not to receive information  6. Serve as a liaison between patient and family and health care team  7. Initiate Consults from Ethics, Palliative Care or initiate Family Care Conference as is appropriate  Outcome: Progressing

## 2024-10-31 NOTE — PROGRESS NOTES
Pts daughter Judy states, I want to keep him alive and I want to do what's best for him. Discussed pt status, POC, VS, IV drips etc. She requests music be played for pt - music changed per request.

## 2024-10-31 NOTE — PROGRESS NOTES
Past Surgical History:     Past Surgical History:   Procedure Laterality Date    CHOLECYSTECTOMY      SHOULDER ARTHROSCOPY W/ ROTATOR CUFF REPAIR Right 2022        Medications Prior to Admission:       Prior to Admission medications    Medication Sig Start Date End Date Taking? Authorizing Provider   clopidogrel (PLAVIX) 75 MG tablet Take 1 tablet by mouth daily   Yes Rylee Butler MD   Dapagliflozin Pro-metFORMIN ER  MG TB24 Take 1 tablet by mouth daily   Yes Rylee Butler MD   Rosuvastatin Calcium 20 MG CPSP Take 1 tablet by mouth daily   Yes Rylee Butler MD   tamsulosin (FLOMAX) 0.4 MG capsule Take 2 capsules by mouth daily   Yes Rylee Butler MD   cyclobenzaprine (FLEXERIL) 10 MG tablet Take 1 tablet by mouth 3 times daily as needed for Muscle spasms   Yes Rylee Butler MD   DULoxetine (CYMBALTA) 30 MG capsule Take 3 capsules by mouth daily    Rylee Butler MD   oxyCODONE-acetaminophen (PERCOCET)  MG per tablet Take 1 tablet by mouth every 6 hours as needed for Pain.    ProviderRylee MD        Allergies:       Erythromycin    Social History:     Tobacco:    reports that he has been smoking cigarettes. He has a 20 pack-year smoking history. He does not have any smokeless tobacco history on file.  Alcohol:      reports no history of alcohol use.  Drug Use:  reports no history of drug use.    Family History:     No family history on file.      Physical Exam:     Vitals:  /86   Pulse (!) 113   Temp 100 °F (37.8 °C)   Resp 11   Ht 1.829 m (6')   Wt 61.8 kg (136 lb 3.9 oz)   SpO2 (!) 84%   BMI 18.48 kg/m²   Temp (24hrs), Av.5 °F (38.1 °C), Min:100 °F (37.8 °C), Max:100.9 °F (38.3 °C)          General appearance -extubated, obtunded, actively dying  Mental status -unable to assess  Head - normocephalic and atraumatic  Eyes - pupils equal and reactive, conjunctiva clear  Ears - hearing appears to be intact  Nose - no  230*  --   --   --   --   --   --   --     < > = values in this interval not displayed.     Recent Labs     10/28/24  2001 10/29/24  1057 10/29/24  1835 10/29/24  1918 10/30/24  0601 10/30/24  0757 10/30/24  1451 10/30/24  1654 10/30/24  1921 10/30/24  2354 10/31/24  0121 10/31/24  0325 10/31/24  0626   LABA1C  --   --   --   --   --   --  7.7*  --   --   --   --   --   --    AST  --   --  96*  --   --   --   --   --   --   --   --   --   --    ALT  --   --  176*  --   --   --   --   --   --   --   --   --   --    ALKPHOS  --   --  88  --   --   --   --   --   --   --   --   --   --    BILITOT  --   --  0.4  --   --   --   --   --   --   --   --   --   --    BILIDIR  --   --  0.3*  --   --   --   --   --   --   --   --   --   --    AMMONIA 32  --   --   --   --   --   --   --   --   --   --   --   --    LIPASE  --   --  10*  --   --   --   --   --   --   --   --   --   --    TRIG  --   --   --   --  155*  --   --   --   --   --   --   --   --    POCGLU  --    < >  --    < >  --    < >  --  255* 263* 262* 304* 289* 366*    < > = values in this interval not displayed.       Lab Results   Component Value Date    INR 1.5 10/28/2024    PROTIME 17.6 (H) 10/28/2024       Lab Results   Component Value Date/Time    SPECIAL ART,10ML 10/29/2024 06:45 PM     Lab Results   Component Value Date/Time    CULTURE NO GROWTH 1 DAY 10/29/2024 06:45 PM       Lab Results   Component Value Date/Time    POCPH 7.487 10/31/2024 05:13 AM    POCPCO2 39.2 10/31/2024 05:13 AM    POCPO2 75.1 10/31/2024 05:13 AM    POCHCO3 29.7 10/31/2024 05:13 AM    NBEA 4.0 10/28/2024 08:51 PM    PBEA 5.9 10/31/2024 05:13 AM    WFMG1DHC 95.9 10/31/2024 05:13 AM    FIO2 40.0 10/31/2024 05:13 AM       Radiology:    XR CHEST PORTABLE    Result Date: 10/29/2024  1.  Interval intubation. 2.  Perihilar airspace opacities and  bilateral pleural effusions unchanged. 3.  Right perihilar masslike opacity.     XR CHEST PORTABLE    Result Date: 10/29/2024  No significant

## 2024-10-31 NOTE — PROGRESS NOTES
Pulmonary Critical Care Progress Note    Patient seen for the follow up of Hyponatremia     Subjective:    Patient is still sedated on propofol and shock requiring Levophed at 8 and vasopressin 0.03.  He is unresponsive on sedation with Versed  Examination:    Vitals: BP (!) 117/92   Pulse (!) 109   Temp 100.4 °F (38 °C)   Resp 20   Ht 1.829 m (6')   Wt 61.8 kg (136 lb 3.9 oz)   SpO2 91%   BMI 18.48 kg/m²   SpO2  Av.6 %  Min: 83 %  Max: 100 %  General appearance: Intubated sedated  Neck: No JVD  Lungs: Decreased breath sound no crackles or wheezing  Heart: regular rate and rhythm, S1, S2 normal, no gallop  Abdomen: Soft, non tender, + BS  Extremities: no cyanosis or clubbing. No significant edema chronic left foot ischemic toe changes    LABs:    CBC:   Recent Labs     10/28/24  2001 10/29/24  1835 10/30/24  0601   WBC 25.3*  --  23.9*   HGB 10.1* 10.5* 10.2*   HCT 35.8* 33.4* 33.6*     --  273     BMP:   Recent Labs     10/30/24  0601 10/31/24  0345    141   K 4.4 3.6*   CO2 24 27   BUN 72* 66*   CREATININE 1.3* 1.1   LABGLOM 60* 73   GLUCOSE 202* 320*     PT/INR:   Recent Labs     10/28/24  2001   PROTIME 17.6*   INR 1.5     APTT:  Recent Labs     10/28/24  2001   APTT 30.7     LIVER PROFILE:  Recent Labs     10/29/24  1835   AST 96*   *       Radiology:  Chest x-ray 10/31  Reviewed  1. Overall similar appearing parenchymal infiltrates and pleural effusions  throughout the lungs bilaterally.  2. The endotracheal tube now lies approximately 5 mm below the choco in the  right proximal mainstem bronchus.  3.  The findings were sent to the Radiology Results Communication Center at  5:29 am on 10/31/2024 to be communicated to a licensed caregiver.    Chest x-ray 10/30 reviewed  There is some improved aeration seen in the right lung base, otherwise  similar appearing chest with bilateral effusions and multilobar parenchymal  ground-glass and consolidative infiltrates.

## 2024-10-31 NOTE — PROGRESS NOTES
Spiritual Health History and Assessment/Progress Note  [facility]    (P) Spiritual/Emotional Needs, (P) Family Care,  ,      Name: Rodríguez Krueger MRN: [id number]    Age: 69 y.o.     Sex: male   Language: English   [employer]ybeterian: Anabaptism   Hyponatremia     Date: 10/31/2024            Total Time Calculated: (P) 34 min              Spiritual Assessment continued in [facility] ICU        Referral/Consult From: (P) Palliative Care   Encounter Overview/Reason: (P) Spiritual/Emotional Needs  Service Provided For: (P) Patient and family together    Family grieving as patient to be extubated and likely nearing EOL but expressed appreciation for  spiritual care support and arranging for a sacrament of the sick to be performed by Father Francisco Javier Rose which was accomplished speedily.    Rose Marie, Belief, Meaning:   Patient has beliefs or practices that help with coping during difficult times  Family/Friends have beliefs or practices that help with coping during difficult times      Importance and Influence:  Patient has spiritual/personal beliefs that influence decisions regarding their health  Family/Friends have spiritual/personal beliefs that influence decisions regarding the patient's health    Community:  Patient feels well-supported. Support system includes: Children and Other: family   Family/Friends feel well-supported. Support system includes: Other: family members    Assessment and Plan of Care:     Patient Interventions include: Other: Supportive and sustaining  spiritual care and sacrament of the sick  Family/Friends Interventions include: Facilitated expression of thoughts and feelings and Provided sacramental/Pentecostalism ritual    Patient Plan of Care: Spiritual Care available upon further referral  Family/Friends Plan of Care: Spiritual Care available upon further referral    Electronically signed by Chaplain Pankaj on 10/31/2024 at 12:33 PM

## 2024-10-31 NOTE — PROGRESS NOTES
chloride flush 0.9 % injection 5-40 mL, 2 times per day  sodium chloride flush 0.9 % injection 5-40 mL, PRN  0.9 % sodium chloride infusion, PRN  potassium chloride 20 mEq/50 mL IVPB (Central Line), PRN   Or  potassium chloride 10 mEq/100 mL IVPB (Peripheral Line), PRN  magnesium sulfate 2000 mg in 50 mL IVPB premix, PRN  enoxaparin (LOVENOX) injection 40 mg, Daily  ondansetron (ZOFRAN-ODT) disintegrating tablet 4 mg, Q8H PRN   Or  ondansetron (ZOFRAN) injection 4 mg, Q6H PRN  polyethylene glycol (GLYCOLAX) packet 17 g, Daily PRN  acetaminophen (TYLENOL) tablet 650 mg, Q6H PRN   Or  acetaminophen (TYLENOL) suppository 650 mg, Q6H PRN  LORazepam (ATIVAN) injection 1 mg, Q2H PRN  doxycycline (VIBRAMYCIN) 100 mg in sodium chloride 0.9 % 100 mL IVPB, Q12H  levalbuterol (XOPENEX) nebulizer solution 1.25 mg, 4x Daily RT  budesonide (PULMICORT) nebulizer suspension 500 mcg, BID RT  methylPREDNISolone sodium succ (SOLU-MEDROL) 60 mg in sterile water 0.96 mL injection, Q8H  propofol infusion, Continuous  sodium chloride nebulizer 0.9 % solution 3 mL, Q8H PRN  pantoprazole (PROTONIX) 40 mg in sodium chloride (PF) 0.9 % 10 mL injection, Q12H  sodium chloride flush 0.9 % injection 5-40 mL, 2 times per day  sodium chloride flush 0.9 % injection 5-40 mL, PRN  sodium chloride flush 0.9 % injection 10 mL, PRN  norepinephrine (LEVOPHED) 16 mg in sodium chloride 0.9 % 250 mL infusion, Continuous        Labs:    CBC:  Recent Labs     10/28/24  2001 10/29/24  1835 10/30/24  0601   WBC 25.3*  --  23.9*   RBC 4.80  --  4.91   HGB 10.1* 10.5* 10.2*   HCT 35.8* 33.4* 33.6*   MCV 74.6*  --  68.4*   MCH 21.0*  --  20.8*   MCHC 28.2*  --  30.4   RDW 17.4*  --  17.9*     --  273   MPV 9.6  --  9.9     Chemistry:  Recent Labs     10/28/24  2001 10/28/24  2140 10/28/24  2332 10/29/24  0221 10/29/24  1411 10/29/24  1835 10/29/24  2018 10/30/24  0240 10/30/24  0601 10/31/24  0345   *  --   --    < > 134*  --    < > 138 137 141   K 5.3  tip is below the diaphragm in good position and is likely within the gastric lumen.     CT CHEST PULMONARY EMBOLISM W CONTRAST    Result Date: 10/28/2024  EXAMINATION: CTA OF THE CHEST 10/28/2024 9:07 pm TECHNIQUE: CTA of the chest was performed after the administration of intravenous contrast.  Multiplanar reformatted images are provided for review.  MIP images are provided for review. Automated exposure control, iterative reconstruction, and/or weight based adjustment of the mA/kV was utilized to reduce the radiation dose to as low as reasonably achievable. COMPARISON: None. HISTORY: ORDERING SYSTEM PROVIDED HISTORY: pe TECHNOLOGIST PROVIDED HISTORY: pe Additional Contrast?->1 Reason for Exam: PE FINDINGS: Lungs/pleura: There is a 3.9 x 3.9 cm mass in the right upper lobe with central areas of gas attenuation.  There is a 2.6 cm nodule in the right upper lobe with air bronchogram.  There are small foci of ground-glass attenuation in the right left upper lobes.  There is septal thickening at the apices.  There are large bilateral pleural effusions.  There is almost complete left lower lobe atelectasis.  There are heterogeneous foci of low attenuation in the atelectatic left lower lobe indicating the presence of consolidation within the atelectatic lung.  There is right lower lobe segmental atelectasis.  No pneumothorax. Mediastinum and lymph nodes: There is mediastinal and left axillary adenopathy.  For example, subcarinal 3.9 cm node up and left axillary 1.4 cm node on series 2, image 24.  There are enlarged lymph nodes in left pericardiophrenic fat. Vessels: No pulmonary arterial filling defect. Thoracic aorta is normal caliber. Heart: Normal size.Severe coronary artery calcification is present. Chest wall: Normal Bones: No suspicious lytic or blastic lesion. Upper Abdomen: Stomach is markedly distended with gas.     1.  Right lower lobe 3.9 cm mass with central gas attenuation is suspicious for necrotic

## 2024-10-31 NOTE — CARE COORDINATION
Discharge planning    Updated per nursing that ex wife called and stated patient insurance no longer valid.     Call to HELP department and VM left regarding the above to look into     1330  Verified per Tiana mccarty [facility] that patient insurance is active.

## 2024-10-31 NOTE — SIGNIFICANT EVENT
[facility]  Office: [phone]  Crispin Lloyd DO, Phuc Hollis DO, Sergo Noguera DO, Cory Prater, DO, Anette Nicolas MD, Nikole Pedraza MD, Daisy Marcano MD, Kyleigh Sears MD,  Branden Banks MD, Vanesa Lee MD, Marcelo Art MD,  Jessica Vo DO, Carmita Hoff MD, Chip Hutton MD, Brad Lloyd DO, Shanika Shah MD,  Bhargav Barkley DO, Nasreen Mccann MD, Lilly Zhou MD, Aislinn Ansari MD, Bianca Morris MD,  Carl Lemos MD, Jayashree Wolff MD, Rosy Shea MD, Xena Lopez MD, Fede Quiroz MD, Keely Rodriguez MD, Reid Fabian DO, Dejan Davis DO, Davonte Scott MD,  David Givens MD, Shirley Waterhouse, CNP,  Willow Bansal, CNP, Tevin Tan, CNP,  Anupama Finley, DNP, Paula Arnold, CNP, Kimberlee Malone, CNP, Lexis Perez CNP, Whitney Reynolds, CNP, Kaykay Wang, CNP, Kiersten Au, PA-C, Thu Ibarra, PA-C, Juana Harding, CNP, Ruth Ann Cevallos, CNP, Linda Mendez, CNP, Negrita Sterling, CNS, Tiana Logan, CNP, Lianet Roland, CNP, Tracy Schwab, CNP         [facility]   IN-PATIENT SERVICE   [facility]    Second Visit Note  For more detailed information please refer to the progress note of the day      10/31/2024    3:28 AM    Name:   Rodríguez Krueger  MRN:     [id number]     Acct:      [id number]   Room:   1102/1102-01  IP Day:  2  Admit Date:  10/28/2024  7:32 PM    PCP:   Rupesh Thompson MD  Code Status:  Full Code      Pt vitals were reviewed   New labs were reviewed   Patient was seen    A rapid response was called after reports of patient experiencing V tach. Upon arrival to bedside, patient intubated, biting ET tube, attached to external defibrillator. Two lead monitoring appears consistent sinus tachycardia. Recent telemetry strip pulled, also consistent with sinus tachycardia. EKG ordered to confirm, orders updated as below.     Updated plan :     Check EKG revealed sinus tachycardia  Check Mg and K+, give 1 gram MG  x1  May benefit from cardiology evaluation if primary or critical care feel strongly  Recommend weaning from Levo and initiating Bogdan Synephrine considering tachycardia    Defer rest of care to primary service.  Internal medicine will sign off at this time.    Due to immediate potential for life threatening deterioration in the setting of sinus tachycardia, a total time spent assessing patient, reviewing chart, collaborating with interdisciplinary team and updating plan of care was 32 minutes.    DIEGO Hanks - CNP  10/31/2024  3:28 AM

## 2024-10-31 NOTE — PROGRESS NOTES
End Of Shift Note  [facility] ICU  Summary of shift: Pt was up and down with sedation today, adding precedex to diprivan. Pt on voasopressin and levophed. Pt started on tube feeding and water flushes via NGT. Podiatry consulted who wrapped left foot (toes are blackened). Pt had large loose stool and voided well via duckworth catheter. Plan is for a family meeting with pts 3 kids tomorrow at 1000 with palliative care. Pt remains ventilated, with a right radial arterial line and left arm PICC. Writer was informed pt may have POA or living will papers with a Kofi Mraiposa  [phone] and cell [phone] (message left) asking to call [facility] as a pt may have paperwork at your office but family is unsure. No return call this shift. Lactic level 3.3 and 500ml NS bolus given per Dr Dumont. Temp up to 38.6 rectally and ice packs provided. Pts parents and son arrived this afternoon and updated on pt status and POC.    Vitals:    Vitals:    10/30/24 1943 10/30/24 1945 10/30/24 2000 10/30/24 2015   BP:   107/78    Pulse: (!) 116 (!) 116 (!) 121 (!) 119   Resp: 20 20 20 20   Temp:  (!) 100.8 °F (38.2 °C) (!) 100.8 °F (38.2 °C) (!) 100.6 °F (38.1 °C)   TempSrc:   Rectal    SpO2: 96% 96% 100% 99%   Weight:       Height:            I&O:   Intake/Output Summary (Last 24 hours) at 10/30/2024 2143  Last data filed at 10/30/2024 1848  Gross per 24 hour   Intake 3265.36 ml   Output 2300 ml   Net 965.36 ml       Resp Status: Ventilator.    Ventilator Settings:  Vent Mode: AC/PRVC Resp Rate (Set): 20 bpm/Vt (Set, mL): 500 mL/ /FiO2 : 40 %    Critical Care IV infusions:   VASOpressin 20 Units in sodium chloride 0.9 % 100 mL infusion 0.03 Units/min (10/30/24 1848)    dexmedeTOMIDine (PRECEDEX) 400 mcg in sodium chloride 0.9 % 100 mL infusion 0.8 mcg/kg/hr (10/30/24 1848)    dextrose      sodium chloride      propofol 25 mcg/kg/min (10/30/24 2053)    norepinephrine 9 mcg/min (10/30/24 1848)        LDA:   PICC 10/29/24 Left Brachial  (Active)   Number of days: 1       Peripheral IV 10/28/24 Left Forearm (Active)   Number of days: 2       Peripheral IV 10/29/24 Right Antecubital (Active)   Number of days: 1       NG/OG/NJ/NE Tube Nasogastric 16 fr Right nostril (Active)   Number of days: 1       Urinary Catheter 10/28/24 Baxter (Active)   Number of days: 1       ETT  (Active)   Number of days: 1       Arterial Line 10/29/24 Right Radial (Active)   Number of days: 1

## 2024-10-31 NOTE — PROGRESS NOTES
Patients temp remains 100.8 F despite first tylenol dose. Second dose of tylenol suppository given at 0221. More Ice packs also applied to patient at this time and room temperature decreased. RN will monitor temp.

## 2024-10-31 NOTE — PROGRESS NOTES
Radiology called for a critical xray report. Dr. Dumont notified of results. RT Pearson notified of results

## 2024-10-31 NOTE — PROGRESS NOTES
PALLIATIVE CARE PROGRESS NOTE     NAME:  Rodríguez Krueger  MEDICAL RECORD NUMBER:  [id number]  AGE: 69 y.o.   GENDER: male  : 1955  TODAY'S DATE:  10/31/2024  Room: [facility]1102-01    Reason For Consult:  Goals of care evaluation  Distress management  Symptom Management  Guidance and support  Facilitate communications  Assistance in coordinating care  Recommendations for the above    Plan      Palliative Interaction:    Family meeting held this morning in ICU waiting room.  Patient's 3 children - Raj, Mauri, and Julius were present in addition to patient's ex-wife Xiomy (Raj and Mauri's mother).     I introduced myself my role palliative care.  We discussed overnight events including rapid response for V. tach.  Explained this episode could have led to cardiac arrest.  Thankfully, patient converted rhythm to sinus without intervention.    Patient's daughter had a folder full of documents from his home.  She pulled out a document that had information regarding chemotherapy.  She was unaware of his cancer diagnosis and had multiple questions regarding staging.  Explained that he was given a stage IV lung cancer diagnosis after a biopsy in 2024.  It appears that he had chemotherapy teaching done at [facility] but had declined any further treatment.  Xiomy confirms this to be true as they were diagnosed with lung cancer at the same time.  She opted to have treatment and he declined. Julius became very tearful.    We discussed how his lung cancer plays a role in his current clinical state.  Per Xiomy, patient had repeatedly stated that he would never want to be intubated.  All the family members present discussed his life and the lifestyle he chose to live.  They state that recently they noticed increased hallucinations and anxiety.  They question whether the cancer has spread to his brain.  Xiomy was under the impression that he did have areas on his brain that were affected.    All 3 children  series 3, image 62.    Abdominal wall: Normal    Bones: Mild degenerative changes in the lower thoracic and lumbar spine.    Impression  1. Findings suspicious for bilateral pyelonephritis.    2.  Retroperitoneal adenopathy.    3.  Partially imaged, large bilateral pleural effusions.       Xray Result (most recent):  XR CHEST (SINGLE VIEW FRONTAL) 10/31/2024    Narrative  EXAMINATION:  ONE XRAY VIEW OF THE CHEST    10/31/2024 3:12 am    COMPARISON:  10/30/2024    HISTORY:  ORDERING SYSTEM PROVIDED HISTORY: intubated  TECHNOLOGIST PROVIDED HISTORY:  intubated  Reason for Exam: intubated  Additional signs and symptoms: intubated    FINDINGS:  The endotracheal tube measures approximately 5 mm below the choco and is  located in the right mainstem bronchus.  Left-sided PICC is again identified  in stable position.  Cardiac and mediastinal silhouettes appear similar in  size.  Multilobar parenchymal infiltrates are seen throughout the lungs  bilaterally, with bilateral pleural effusions.  No pneumothorax is identified.    Impression  1. Overall similar appearing parenchymal infiltrates and pleural effusions  throughout the lungs bilaterally.  2. The endotracheal tube now lies approximately 5 mm below the choco in the  right proximal mainstem bronchus.  3.  The findings were sent to the Radiology Results Communication Center at  5:29 am on 10/31/2024 to be communicated to a licensed caregiver.       MRI Result (most recent):  No results found for this or any previous visit from the past 3650 days.      No results found for this or any previous visit.       Encounter Date: 10/28/24   EKG 12 Lead   Result Value    Ventricular Rate 118    Atrial Rate 118    P-R Interval 126    QRS Duration 94    Q-T Interval 326    QTc Calculation (Bazett) 456    P Axis 77    R Axis 81    T Axis -85    Narrative    Sinus tachycardia with Premature atrial complexes  Low voltage QRS  ST & T wave abnormality, consider inferior ischemia  ST & T

## 2024-10-31 NOTE — PROGRESS NOTES
End Of Shift Note  [facility]  Summary of shift: Patients temp remains elevated. PRN tylenol suppository x2 given with little to no effect. Ice packs also applied, temp now 37.8C so starting to trend down. Writer performed oral care on pt this shift, tolerated well. Sedation up and down this shift r/t agitation, no PRN fentanyl needed. Patient had a sequence of V-tach so a rapid response was called, but no meds or shock given, patient converted to NSR on his own. Patients residuals have been little to none this shift, 0-5mL. Patient remains on levophed, vaso, propofol, and precedex. Still receiving IV abx. Patient resting comfortably in bed at end of shift, VSS.     Vitals:    Vitals:    10/31/24 0500 10/31/24 0513 10/31/24 0515 10/31/24 0600   BP:    (!) 136/91   Pulse: (!) 109 (!) 108 (!) 114 (!) 111   Resp: 20 20 20 20   Temp: 100.4 °F (38 °C)  100.2 °F (37.9 °C) 100.2 °F (37.9 °C)   TempSrc:       SpO2: 100% 100%     Weight:       Height:            I&O:   Intake/Output Summary (Last 24 hours) at 10/31/2024 0746  Last data filed at 10/31/2024 0744  Gross per 24 hour   Intake 2956.09 ml   Output 2500 ml   Net 456.09 ml       Resp Status: Patient remains on ventilator     Ventilator Settings:  Vent Mode: AC/PRVC Resp Rate (Set): 20 bpm/Vt (Set, mL): 500 mL/ /FiO2 : 40 %    Critical Care IV infusions:   VASOpressin 20 Units in sodium chloride 0.9 % 100 mL infusion 0.03 Units/min (10/31/24 0744)    dexmedeTOMIDine (PRECEDEX) 400 mcg in sodium chloride 0.9 % 100 mL infusion 1 mcg/kg/hr (10/31/24 0744)    dextrose      sodium chloride      propofol 35 mcg/kg/min (10/31/24 0744)    norepinephrine 8 mcg/min (10/31/24 0744)        LDA:   PICC 10/29/24 Left Brachial (Active)   Number of days: 1       Peripheral IV 10/28/24 Left Forearm (Active)   Number of days: 2       Peripheral IV 10/29/24 Right Antecubital (Active)   Number of days: 2       NG/OG/NJ/NE Tube Nasogastric 16 fr Right nostril (Active)   Number of days:  2       Urinary Catheter 10/28/24 Baxter (Active)   Number of days: 2       ETT  (Active)   Number of days: 1       Arterial Line 10/29/24 Right Radial (Active)   Number of days: 1

## 2024-11-01 VITALS
BODY MASS INDEX: 18.45 KG/M2 | WEIGHT: 136.24 LBS | DIASTOLIC BLOOD PRESSURE: 86 MMHG | TEMPERATURE: 97.9 F | OXYGEN SATURATION: 81 % | SYSTOLIC BLOOD PRESSURE: 108 MMHG | HEIGHT: 72 IN

## 2024-11-01 LAB
MICROORGANISM SPEC CULT: ABNORMAL
MICROORGANISM SPEC CULT: ABNORMAL
SERVICE CMNT-IMP: ABNORMAL
SPECIMEN DESCRIPTION: ABNORMAL

## 2024-11-01 NOTE — PROGRESS NOTES
Coroners office contacted at this time. Patient is not a coroners case and was released at this time. [facility] being notified next.

## 2024-11-01 NOTE — PROGRESS NOTES
SPIRITUAL CARE DEPARTMENT [facility]   Patient Death Note  DEATH                  Room # 1102/1102-01   Name: Rodríguez Krueger            Age: 69 y.o.  Gender: male          Evangelical: Moravian        Admit Date & Time: 10/28/2024  7:32 PM        Actual date of death: 2024   TOD: 01:32am       Referral:  Unit: ICU  Nurse: CHRIS Pozo    IS THIS A 'S CASE?  No    SPIRITUAL/EMOTIONAL INTERVENTION:  Family elected not to be present. Writer provided a silent prayer of celebration for the life of the patient, as well as a final finish of total rest, comfort, and peace.       DOCTOR SIGNING DEATH CERTIFICATE:  Name: Dr. Dayna Haney  Phone number: [phone]    Copy of COMPLETED Release of Body Form Received?  Yes     HOME:  Contacted Time 04:00qm  Name: Morena  Home  City: [facility]  Phone Number: [phone]    NEXT OF KIN:  Name: Xiomy Krueger  Relationship: Ex-Spouse  Street Address: [address]   City: [place]  State: [place]  Zip code: [id number]   Phone Number: [phone]    ANY FOLLOW-UP NEEDED?  No    Electronically signed by Chaplain Holden Jr, on 2024 at 4:30 AM.  Spiritual Care Department  [facility]  [phone]     24 0425   Encounter Summary   Encounter Overview/Reason Grief, Loss, and Adjustments   Service Provided For Family  (Family not present)   Last Encounter  24   Complexity of Encounter Low   Begin Time 0330   End Time  0429   Total Time Calculated 59 min   Grief, Loss, and Adjustments   Type Death

## 2024-11-01 NOTE — PROGRESS NOTES
[employer] called at this time. [employer] will be calling writer back within the hour to see if patient is eligible for any donations.

## 2024-11-01 NOTE — PROGRESS NOTES
All attendings- Dr. Haney, Dr. Dumont, Dr. Starr, and Dr. Robles's groups notified regarding patients death.

## 2024-11-02 LAB
MICROORGANISM SPEC CULT: NORMAL
MICROORGANISM SPEC CULT: NORMAL
SERVICE CMNT-IMP: NORMAL
SERVICE CMNT-IMP: NORMAL
SPECIMEN DESCRIPTION: NORMAL
SPECIMEN DESCRIPTION: NORMAL
